# Patient Record
Sex: FEMALE | Race: WHITE | NOT HISPANIC OR LATINO | Employment: OTHER | URBAN - NONMETROPOLITAN AREA
[De-identification: names, ages, dates, MRNs, and addresses within clinical notes are randomized per-mention and may not be internally consistent; named-entity substitution may affect disease eponyms.]

---

## 2018-04-14 ENCOUNTER — OFFICE VISIT (OUTPATIENT)
Dept: FAMILY MEDICINE | Facility: OTHER | Age: 68
End: 2018-04-14
Attending: FAMILY MEDICINE
Payer: COMMERCIAL

## 2018-04-14 VITALS
HEART RATE: 86 BPM | BODY MASS INDEX: 30.59 KG/M2 | SYSTOLIC BLOOD PRESSURE: 130 MMHG | DIASTOLIC BLOOD PRESSURE: 78 MMHG | HEIGHT: 67 IN | WEIGHT: 194.9 LBS | TEMPERATURE: 97.4 F

## 2018-04-14 DIAGNOSIS — N30.01 ACUTE CYSTITIS WITH HEMATURIA: Primary | ICD-10-CM

## 2018-04-14 DIAGNOSIS — R39.89 URINARY PROBLEM: ICD-10-CM

## 2018-04-14 LAB
ALBUMIN UR-MCNC: NEGATIVE MG/DL
APPEARANCE UR: ABNORMAL
BACTERIA #/AREA URNS HPF: ABNORMAL /HPF
BILIRUB UR QL STRIP: NEGATIVE
COLOR UR AUTO: YELLOW
GLUCOSE UR STRIP-MCNC: NEGATIVE MG/DL
HGB UR QL STRIP: ABNORMAL
KETONES UR STRIP-MCNC: NEGATIVE MG/DL
LEUKOCYTE ESTERASE UR QL STRIP: ABNORMAL
NITRATE UR QL: NEGATIVE
PH UR STRIP: 6 PH (ref 5–7)
RBC #/AREA URNS AUTO: ABNORMAL /HPF
SOURCE: ABNORMAL
SP GR UR STRIP: <1.005 (ref 1–1.03)
UROBILINOGEN UR STRIP-ACNC: 0.2 EU/DL (ref 0.2–1)
WBC #/AREA URNS AUTO: >100 /HPF

## 2018-04-14 PROCEDURE — 81001 URINALYSIS AUTO W/SCOPE: CPT | Performed by: FAMILY MEDICINE

## 2018-04-14 PROCEDURE — 87086 URINE CULTURE/COLONY COUNT: CPT | Performed by: FAMILY MEDICINE

## 2018-04-14 PROCEDURE — 87088 URINE BACTERIA CULTURE: CPT | Performed by: FAMILY MEDICINE

## 2018-04-14 PROCEDURE — 99213 OFFICE O/P EST LOW 20 MIN: CPT | Performed by: FAMILY MEDICINE

## 2018-04-14 RX ORDER — SULFAMETHOXAZOLE/TRIMETHOPRIM 800-160 MG
1 TABLET ORAL 2 TIMES DAILY
Qty: 14 TABLET | Refills: 0 | Status: SHIPPED | OUTPATIENT
Start: 2018-04-14 | End: 2019-03-18

## 2018-04-14 ASSESSMENT — PAIN SCALES - GENERAL: PAINLEVEL: MODERATE PAIN (5)

## 2018-04-14 NOTE — NURSING NOTE
Patient presents with dysuria, hot and cold flashes off and on for 2-4 days. Ibuprofen gives relief. Gina Chow LPN .............4/14/2018  11:51 AM

## 2018-04-14 NOTE — MR AVS SNAPSHOT
"              After Visit Summary   4/14/2018    Fay Macedo    MRN: [id number]           Patient Information     Date Of Birth          1950        Visit Information        Provider Department      4/14/2018 11:30 AM Joanne Khan DO [facility]        Today's Diagnoses     Acute cystitis with hematuria    -  1    Urinary problem           Follow-ups after your visit        Who to contact     If you have questions or need follow up information about today's clinic visit or your schedule please contact [facility] directly at [phone].  Normal or non-critical lab and imaging results will be communicated to you by MyChart, letter or phone within 4 business days after the clinic has received the results. If you do not hear from us within 7 days, please contact the clinic through MyChart or phone. If you have a critical or abnormal lab result, we will notify you by phone as soon as possible.  Submit refill requests through [employer] or call your pharmacy and they will forward the refill request to us. Please allow 3 business days for your refill to be completed.          Additional Information About Your Visit        Care EveryWhere ID     This is your Care EveryWhere ID. This could be used by other organizations to access your [facility] medical records  [id number]        Your Vitals Were     Pulse Temperature Height Breastfeeding? BMI (Body Mass Index)       86 97.4  F (36.3  C) (Tympanic) 5' 6.53\" (1.69 m) No 30.95 kg/m2        Blood Pressure from Last 3 Encounters:   04/14/18 130/78    Weight from Last 3 Encounters:   04/14/18 194 lb 14.4 oz (88.4 kg)              We Performed the Following     *UA reflex to Microscopic     Urine Culture Aerobic Bacterial     Urine Microscopic          Today's Medication Changes          These changes are accurate as of 4/14/18 11:59 PM.  If you have any questions, ask your nurse or doctor.               Start taking these " medicines.        Dose/Directions    sulfamethoxazole-trimethoprim 800-160 MG per tablet   Commonly known as:  BACTRIM DS/SEPTRA DS   Used for:  Acute cystitis with hematuria   Started by:  Joanne Khan DO        Dose:  1 tablet   Take 1 tablet by mouth 2 times daily   Quantity:  14 tablet   Refills:  0            Where to get your medicines      These medications were sent to [id number] - [place], [place] - [address]  [address], [facility] [address]     Phone:  [phone]     sulfamethoxazole-trimethoprim 800-160 MG per tablet                Primary Care Provider Fax #    Physician No Ref-Primary [phone]       No address on file        Equal Access to Services     CHARLEE ROSS : Mushtaq Hoang, meena bauman, qanajma kaalmaaaron bradford, chris morocho. So [facility] [phone].    ATENCIÓN: Si habla español, tiene a blanco disposición servicios gratuitos de asistencia lingüística. Llame al [phone].    We comply with applicable federal civil rights laws and [place] laws. We do not discriminate on the basis of race, color, national origin, age, disability, sex, sexual orientation, or gender identity.            Thank you!     Thank you for choosing [facility]  for your care. Our goal is always to provide you with excellent care. Hearing back from our patients is one way we can continue to improve our services. Please take a few minutes to complete the written survey that you may receive in the mail after your visit with us. Thank you!             Your Updated Medication List - Protect others around you: Learn how to safely use, store and throw away your medicines at www.disposemymeds.org.          This list is accurate as of 4/14/18 11:59 PM.  Always use your most recent med list.                   Brand Name Dispense Instructions for use Diagnosis    sulfamethoxazole-trimethoprim 800-160  MG per tablet    BACTRIM DS/SEPTRA DS    14 tablet    Take 1 tablet by mouth 2 times daily    Acute cystitis with hematuria

## 2018-04-15 NOTE — PROGRESS NOTES
"  SUBJECTIVE:   Fay Macedo is a 67 year old female who presents to clinic today for the following health issues:    HPI  URINARY TRACT SYMPTOMS      Duration: ~1 week for some discomfort with urination; however more fevers/chills the last 2-3 days.     Description  dysuria, frequency and hesitancy    Intensity:  mild    Accompanying signs and symptoms:  Fever/chills: YES- recent change; reason she decided to come in.  Flank pain no   Nausea and vomiting: no   Vaginal symptoms: none  Abdominal/Pelvic Pain: no     History  History of frequent UTI's: YES  History of kidney stones: no   Sexually Active: YES  Possibility of pregnancy: No    Precipitating or alleviating factors: None    Therapies tried and outcome: cranberry juice, increase fluid intake and ibuprofen   Outcome: Some relief.    Problem list and histories reviewed & adjusted, as indicated.  Additional history: as documented    There is no problem list on file for this patient.    History reviewed. No pertinent surgical history.    Social History   Substance Use Topics     Smoking status: Never Smoker     Smokeless tobacco: Never Used     Alcohol use No     History reviewed. No pertinent family history.      Current Outpatient Prescriptions   Medication Sig Dispense Refill     sulfamethoxazole-trimethoprim (BACTRIM DS/SEPTRA DS) 800-160 MG per tablet Take 1 tablet by mouth 2 times daily 14 tablet 0     No Known Allergies    ROS:  Constitutional, HEENT, cardiovascular, pulmonary, gi systems are negative, except as otherwise noted.    OBJECTIVE:     /78 (BP Location: Left arm, Patient Position: Sitting, Cuff Size: Adult Regular)  Pulse 86  Temp 97.4  F (36.3  C) (Tympanic)  Ht 5' 6.53\" (1.69 m)  Wt 194 lb 14.4 oz (88.4 kg)  Breastfeeding? No  BMI 30.95 kg/m2  Body mass index is 30.95 kg/(m^2).  GENERAL: healthy, alert and no distress  NECK: no adenopathy, no asymmetry, masses, or scars and thyroid normal to palpation  RESP: lungs clear to " auscultation - no rales, rhonchi or wheezes  CV: regular rate and rhythm, normal S1 S2, no S3 or S4, no murmur, click or rub, no peripheral edema and peripheral pulses strong  ABDOMEN: soft, nontender, no hepatosplenomegaly, no masses and bowel sounds normal  MS: no gross musculoskeletal defects noted, no edema  SKIN: no suspicious lesions or rashes    Diagnostic Test Results:  Results for orders placed or performed in visit on 04/14/18 (from the past 24 hour(s))   *UA reflex to Microscopic   Result Value Ref Range    Color Urine Yellow     Appearance Urine Cloudy     Glucose Urine Negative NEG^Negative mg/dL    Bilirubin Urine Negative NEG^Negative    Ketones Urine Negative NEG^Negative mg/dL    Specific Gravity Urine <1.005 1.003 - 1.035    Blood Urine Moderate (A) NEG^Negative    pH Urine 6.0 5.0 - 7.0 pH    Protein Albumin Urine Negative NEG^Negative mg/dL    Urobilinogen Urine 0.2 0.2 - 1.0 EU/dL    Nitrite Urine Negative NEG^Negative    Leukocyte Esterase Urine Large (A) NEG^Negative    Source Midstream Urine    Urine Microscopic   Result Value Ref Range    WBC Urine >100 (A) OTO5^0 - 5 /HPF    RBC Urine 5-10 (A) OTO2^O - 2 /HPF    Bacteria Urine Few (A) NEG^Negative /HPF   Urine Culture Aerobic Bacterial   Result Value Ref Range    Specimen Description Unspecified Urine     Culture Micro (A)      10,000 to 50,000 colonies/mL  Lactose fermenting gram negative rods         ASSESSMENT/PLAN:     1. Urinary problem  - *UA reflex to Microscopic  - Urine Microscopic    2. Acute cystitis with hematuria  UA with evidence of acute infection.  UC initiated.  Start treatment with Bactrim DS x 7 days.  Continue increased fluid intake.  Discussed pyridium for relief of discomfort if ibuprofen fails to help.  Expected course of resolve reviewed.  Return with any further concerns.  - Urine Culture Aerobic Bacterial  - sulfamethoxazole-trimethoprim (BACTRIM DS/SEPTRA DS) 800-160 MG per tablet; Take 1 tablet by mouth 2 times  daily  Dispense: 14 tablet; Refill: 0    DO RAHUL Rodríguez [facility]

## 2018-04-16 LAB
BACTERIA SPEC CULT: ABNORMAL
SPECIMEN SOURCE: ABNORMAL

## 2019-03-18 ENCOUNTER — OFFICE VISIT (OUTPATIENT)
Dept: FAMILY MEDICINE | Facility: OTHER | Age: 69
End: 2019-03-18
Attending: NURSE PRACTITIONER
Payer: COMMERCIAL

## 2019-03-18 VITALS
OXYGEN SATURATION: 95 % | SYSTOLIC BLOOD PRESSURE: 148 MMHG | HEART RATE: 96 BPM | DIASTOLIC BLOOD PRESSURE: 80 MMHG | WEIGHT: 190 LBS | RESPIRATION RATE: 20 BRPM | TEMPERATURE: 98.9 F | HEIGHT: 67 IN | BODY MASS INDEX: 29.82 KG/M2

## 2019-03-18 DIAGNOSIS — J11.1 INFLUENZA-LIKE ILLNESS: Primary | ICD-10-CM

## 2019-03-18 PROCEDURE — 99214 OFFICE O/P EST MOD 30 MIN: CPT | Performed by: NURSE PRACTITIONER

## 2019-03-18 RX ORDER — ESTROGENS, CONJUGATED 0.45 MG/1
0.45 TABLET, FILM COATED ORAL DAILY
COMMUNITY
Start: 2019-03-16

## 2019-03-18 RX ORDER — BROMFENAC SODIUM 0.7 MG/ML
1 SOLUTION/ DROPS OPHTHALMIC DAILY
COMMUNITY

## 2019-03-18 RX ORDER — BENZONATATE 200 MG/1
200 CAPSULE ORAL 3 TIMES DAILY PRN
Qty: 30 CAPSULE | Refills: 0 | Status: SHIPPED | OUTPATIENT
Start: 2019-03-18 | End: 2019-03-28

## 2019-03-18 RX ORDER — CALCIUM CARBONATE/VITAMIN D3 600 MG-10
250 TABLET ORAL DAILY
COMMUNITY

## 2019-03-18 RX ORDER — CODEINE PHOSPHATE AND GUAIFENESIN 10; 100 MG/5ML; MG/5ML
2 SOLUTION ORAL EVERY 6 HOURS PRN
Qty: 160 ML | Refills: 0 | Status: SHIPPED | OUTPATIENT
Start: 2019-03-18

## 2019-03-18 RX ORDER — OMEGA-3 FATTY ACIDS/FISH OIL 300-1000MG
1 CAPSULE ORAL 2 TIMES DAILY
COMMUNITY

## 2019-03-18 ASSESSMENT — MIFFLIN-ST. JEOR: SCORE: 1434.46

## 2019-03-18 NOTE — NURSING NOTE
Patient presents to the clinic for productive cough, chest congestion x 6 days. Patient has taken DM cough and cold medicine and tylenol for treatment.  Medication Reconciliation: complete    Tianna Armando, CMA

## 2019-03-18 NOTE — PATIENT INSTRUCTIONS
Patient Education     The Flu (Influenza)     The virus that causes the flu spreads through the air in droplets when someone who has the flu coughs, sneezes, laughs, or talks.   The flu (influenza) is an infection that affects your respiratory tract. This tract is made up of your mouth, nose, and lungs, and the passages between them. Unlike a cold, the flu can make you very ill. And it can lead to pneumonia, a serious lung infection. The flu can have serious complications and even cause death.  Who is at risk for the flu?  Anyone can get the flu. But you are more likely to become infected if you:    Have a weakened immune system    Work in a healthcare setting where you may be exposed to flu germs    Live or work with someone who has the flu    Haven t had an annual flu shot  How does the flu spread?  The flu is caused by a virus. The virus spreads through the air in droplets when someone who has the flu coughs, sneezes, laughs, or talks. You can become infected when you inhale these viruses directly. You can also become infected when you touch a surface on which the droplets have landed and then transfer the germs to your eyes, nose, or mouth. Touching used tissues, or sharing utensils, drinking glasses, or a toothbrush from an infected person can expose you to flu viruses, too.  What are the symptoms of the flu?  Flu symptoms tend to come on quickly and may last a few days to a few weeks. They include:    Fever usually higher than 100.4 F  (38 C) and chills    Sore throat and headache    Dry cough    Runny nose    Tiredness and weakness    Muscle aches  Who is at risk for flu complications?  For some people, the flu can be very serious. The risk for complications is greater for:    Children younger than age 5    Adults ages 65 and older    People with a chronic illness such as diabetes or heart, kidney, or lung disease    People who live in a nursing home or long-term care facility   How is the flu treated?  The  flu usually gets better after 7 days or so. In some cases, your healthcare provider may prescribe an antiviral medicine. This may help you get well a little sooner. For the medicine to help, you need to take it as soon as possible (ideally within 48 hours) after your symptoms start. If you develop pneumonia or other serious illness, you may need to stay in the hospital.  Easing flu symptoms    Drink lots of fluids such as water, juice, and warm soup. A good rule is to drink enough so that you urinate your normal amount.    Get plenty of rest.    Ask your healthcare provider what to take for fever and pain.    Call your provider if your fever is 100.4 F (38 C) or higher, or you become dizzy, lightheaded, or short of breath.  Taking steps to protect others    Wash your hands often, especially after coughing or sneezing. Or clean your hands with an alcohol-based hand  containing at least 60% alcohol.    Cough or sneeze into a tissue. Then throw the tissue away and wash your hands. If you don t have a tissue, cough and sneeze into your elbow.    Stay home until at least 24 hours after you no longer have a fever or chills. Be sure the fever isn t being hidden by fever-reducing medicine.    Don t share food, utensils, drinking glasses, or a toothbrush with others.    Ask your healthcare provider if others in your household should get antiviral medicine to help them avoid infection.  How can the flu be prevented?    One of the best ways to avoid the flu is to get a flu vaccine each year. The virus that causes the flu changes from year to year. For that reason, healthcare providers recommend getting the flu vaccine each year, as soon as it's available in your area. The vaccine is given as a shot. Your healthcare provider can tell you which vaccine is right for you. The nasal spray is not recommended for the 5140-4203 flu season. The CDC says the nasal spray did not seem to protect against the flu over the last  several flu seasons.    Wash your hands often. Frequent handwashing is a proven way to help prevent infection.    Carry an alcohol-based hand gel containing at least 60% alcohol. Use it when you can't use soap and water. Then wash your hands as soon as you can.    Avoid touching your eyes, nose, and mouth.    At home and work, clean phones, computer keyboards, and toys often with disinfectant wipes.    If possible, avoid close contact with others who have the flu or symptoms of the flu.  Handwashing tips  Handwashing is one of the best ways to prevent many common infections. If you are caring for or visiting someone with the flu, wash your hands each time you enter and leave the room. Follow these steps:    Use warm water and plenty of soap. Rub your hands together well.    Clean the whole hand, including under your nails, between your fingers, and up the wrists.    Wash for at least 15 seconds.    Rinse, letting the water run down your fingers, not up your wrists.    Dry your hands well. Use a paper towel to turn off the faucet and open the door.  Using alcohol-based hand   Alcohol-based hand  are also a good choice. Use them when you can't use soap and water. Follow these steps:    Squeeze about a tablespoon of gel into the palm of one hand.    Rub your hands together briskly, cleaning the backs of your hands, the palms, between your fingers, and up the wrists.    Rub until the gel is gone and your hands are completely dry.  Preventing the flu in healthcare settings  The flu is a special concern for people in hospitals and long-term care facilities. To help prevent the spread of flu, many hospitals and nursing homes take these steps:    Healthcare providers wash their hands or use an alcohol-based hand  before and after treating each patient.    People with the flu have private rooms and bathrooms or share a room with someone with the same infection.    People who are at high risk for the  flu but don't have it are encouraged to get the flu and pneumonia vaccines.    All healthcare workers are encouraged or required to get flu shots.

## 2019-03-18 NOTE — PROGRESS NOTES
"Nursing Notes:   Tianna Armando CMA  3/18/2019 10:24 AM  Signed  Patient presents to the clinic for productive cough, chest congestion x 6 days. Patient has taken DM cough and cold medicine and tylenol for treatment.  Medication Reconciliation: complete    Tianna Armando CMA      SUBJECTIVE:   Fay Macedo is a 68 year old female who presents to clinic today for the following health issues:    RESPIRATORY SYMPTOMS      Duration: 5-6 days    Description  nasal congestion, sore throat, cough, chills, fatigue/malaise and myalgias    Severity: severe    Accompanying signs and symptoms: Unknown fever, suspected, chills.     History (predisposing factors):  none    Precipitating or alleviating factors: None    Therapies tried and outcome:  rest and fluids acetaminophen OTC NSAID guaifenesin    She did not get a flu shot this season.     Problem list and histories reviewed & adjusted, as indicated.  Additional history: as documented    There is no problem list on file for this patient.    History reviewed. No pertinent surgical history.    Social History     Tobacco Use     Smoking status: Never Smoker     Smokeless tobacco: Never Used   Substance Use Topics     Alcohol use: No     History reviewed. No pertinent family history.      Current Outpatient Medications   Medication Sig Dispense Refill     aspirin (ASA) 81 MG tablet        bromfenac (PROLENSA) 0.07 % ophthalmic solution        CALCIUM PO        omega 3 1000 MG CAPS        PREMARIN 0.45 MG tablet        Psyllium 48.57 % POWD        vitamin B-12 (CYANOCOBALAMIN) 250 MCG tablet        No Known Allergies      ROS:  Notable findings in the HPI.       OBJECTIVE:     /80 (BP Location: Left arm, Patient Position: Sitting, Cuff Size: Adult Regular)   Pulse 96   Temp 98.9  F (37.2  C) (Tympanic)   Resp 20   Ht 1.702 m (5' 7\")   Wt 86.2 kg (190 lb)   SpO2 95%   BMI 29.76 kg/m    Body mass index is 29.76 kg/m .  GENERAL: alert and no distress  EYES: Eyes " grossly normal to inspection  HENT: normal cephalic/atraumatic, right ear: normal: no effusions, no erythema, normal landmarks, left ear: normal: no effusions, no erythema, normal landmarks, nose and mouth without ulcers or lesions, nasal mucosa edematous , rhinorrhea yellow, oropharynx clear and oral mucous membranes moist  NECK: no adenopathy  RESP: lungs clear to auscultation - no rales, rhonchi or wheezes, dry cough noted  CV: regular rate and rhythm, normal S1 S2, no S3 or S4, no murmur, click or rub, no peripheral edema and peripheral pulses strong  SKIN: no suspicious lesions or rashes  PSYCH: mentation appears normal, affect normal/bright    Diagnostic Test Results:  none     ASSESSMENT/PLAN:     1. Influenza-like illness  - guaiFENesin-codeine (ROBITUSSIN AC) 100-10 MG/5ML solution; Take 10 mLs by mouth every 6 hours as needed for cough  Dispense: 160 mL; Refill: 0  - benzonatate (TESSALON) 200 MG capsule; Take 1 capsule (200 mg) by mouth 3 times daily as needed for cough  Dispense: 30 capsule; Refill: 0      PLAN:    URI Adult:  Tylenol, Ibuprofen, Fluids, Rest, OTC cough suppressant/expectorant, OTC decongestant/antihistamine, Saline gargles, Saline nasal spray, Vaporizer and   Testing not treatment is indicated at this time.     Followup:    If not improving or if condition worsens, follow up with your Primary Care Provider    I explained my diagnostic considerations and recommendations to the patient, who voiced understanding and agreement with the treatment plan. All questions were answered. We discussed potential side effects of any prescribed or recommended therapies, as well as expectations for response to treatments. She was advised to contact our office if there is no improvement or worsening of conditions or symptoms.  If s/s worsen or persist, patient will either come back or follow up with PCP.    Disclaimer:  This note consists of words and symbols derived from keyboarding, dictation, or using  voice recognition software. As a result, there may be errors in the script that have gone undetected. Please consider this when interpreting information found in this note.      Lorna Contreras NP, 3/18/2019 10:23 AM

## 2019-03-20 ENCOUNTER — HOSPITAL ENCOUNTER (INPATIENT)
Facility: OTHER | Age: 69
LOS: 1 days | Discharge: HOME | DRG: 195 | End: 2019-03-22
Attending: INTERNAL MEDICINE | Admitting: INTERNAL MEDICINE
Payer: COMMERCIAL

## 2019-03-20 ENCOUNTER — OFFICE VISIT (OUTPATIENT)
Dept: FAMILY MEDICINE | Facility: OTHER | Age: 69
DRG: 195 | End: 2019-03-20
Attending: NURSE PRACTITIONER
Payer: COMMERCIAL

## 2019-03-20 ENCOUNTER — HOSPITAL ENCOUNTER (OUTPATIENT)
Dept: GENERAL RADIOLOGY | Facility: OTHER | Age: 69
DRG: 195 | End: 2019-03-20
Attending: NURSE PRACTITIONER
Payer: COMMERCIAL

## 2019-03-20 VITALS
WEIGHT: 190 LBS | BODY MASS INDEX: 29.82 KG/M2 | SYSTOLIC BLOOD PRESSURE: 130 MMHG | TEMPERATURE: 98.1 F | RESPIRATION RATE: 18 BRPM | OXYGEN SATURATION: 96 % | HEART RATE: 92 BPM | DIASTOLIC BLOOD PRESSURE: 68 MMHG | HEIGHT: 67 IN

## 2019-03-20 DIAGNOSIS — J18.9 PNEUMONIA OF RIGHT LOWER LOBE DUE TO INFECTIOUS ORGANISM: ICD-10-CM

## 2019-03-20 DIAGNOSIS — E86.0 DEHYDRATION: ICD-10-CM

## 2019-03-20 DIAGNOSIS — J18.9 COMMUNITY ACQUIRED PNEUMONIA OF LEFT LOWER LOBE OF LUNG: ICD-10-CM

## 2019-03-20 DIAGNOSIS — R11.14 BILIOUS VOMITING WITH NAUSEA: ICD-10-CM

## 2019-03-20 DIAGNOSIS — R05.9 COUGH: ICD-10-CM

## 2019-03-20 DIAGNOSIS — J10.1 INFLUENZA A: ICD-10-CM

## 2019-03-20 DIAGNOSIS — R11.2 INTRACTABLE VOMITING WITH NAUSEA, UNSPECIFIED VOMITING TYPE: ICD-10-CM

## 2019-03-20 DIAGNOSIS — J10.1 INFLUENZA A: Primary | ICD-10-CM

## 2019-03-20 DIAGNOSIS — R05.9 COUGH: Primary | ICD-10-CM

## 2019-03-20 LAB
ANION GAP SERPL CALCULATED.3IONS-SCNC: 9 MMOL/L (ref 3–14)
BASOPHILS # BLD AUTO: 0 10E9/L (ref 0–0.2)
BASOPHILS NFR BLD AUTO: 0.4 %
BUN SERPL-MCNC: 9 MG/DL (ref 7–25)
CALCIUM SERPL-MCNC: 9.4 MG/DL (ref 8.6–10.3)
CHLORIDE SERPL-SCNC: 99 MMOL/L (ref 98–107)
CO2 SERPL-SCNC: 26 MMOL/L (ref 21–31)
CREAT SERPL-MCNC: 0.68 MG/DL (ref 0.6–1.2)
CRP SERPL-MCNC: 12 MG/L
DIFFERENTIAL METHOD BLD: NORMAL
EOSINOPHIL # BLD AUTO: 0 10E9/L (ref 0–0.7)
EOSINOPHIL NFR BLD AUTO: 0.4 %
ERYTHROCYTE [DISTWIDTH] IN BLOOD BY AUTOMATED COUNT: 13 % (ref 10–15)
FLUAV+FLUBV RNA SPEC QL NAA+PROBE: NEGATIVE
FLUAV+FLUBV RNA SPEC QL NAA+PROBE: POSITIVE
GFR SERPL CREATININE-BSD FRML MDRD: 87 ML/MIN/{1.73_M2}
GLUCOSE SERPL-MCNC: 136 MG/DL (ref 70–105)
HCT VFR BLD AUTO: 43.3 % (ref 35–47)
HGB BLD-MCNC: 14.2 G/DL (ref 11.7–15.7)
IMM GRANULOCYTES # BLD: 0 10E9/L (ref 0–0.4)
IMM GRANULOCYTES NFR BLD: 0.2 %
LYMPHOCYTES # BLD AUTO: 1.3 10E9/L (ref 0.8–5.3)
LYMPHOCYTES NFR BLD AUTO: 15 %
MCH RBC QN AUTO: 29.6 PG (ref 26.5–33)
MCHC RBC AUTO-ENTMCNC: 32.8 G/DL (ref 31.5–36.5)
MCV RBC AUTO: 90 FL (ref 78–100)
MONOCYTES # BLD AUTO: 0.6 10E9/L (ref 0–1.3)
MONOCYTES NFR BLD AUTO: 6.9 %
NEUTROPHILS # BLD AUTO: 6.6 10E9/L (ref 1.6–8.3)
NEUTROPHILS NFR BLD AUTO: 77.1 %
PLATELET # BLD AUTO: 212 10E9/L (ref 150–450)
POTASSIUM SERPL-SCNC: 3.8 MMOL/L (ref 3.5–5.1)
PROCALCITONIN SERPL-MCNC: 0.4 NG/ML
RBC # BLD AUTO: 4.79 10E12/L (ref 3.8–5.2)
RSV RNA SPEC NAA+PROBE: NEGATIVE
SODIUM SERPL-SCNC: 134 MMOL/L (ref 134–144)
SPECIMEN SOURCE: ABNORMAL
WBC # BLD AUTO: 8.5 10E9/L (ref 4–11)

## 2019-03-20 PROCEDURE — 99219 ZZC INITIAL OBSERVATION CARE,LEVL II: CPT | Performed by: INTERNAL MEDICINE

## 2019-03-20 PROCEDURE — 85025 COMPLETE CBC W/AUTO DIFF WBC: CPT | Performed by: NURSE PRACTITIONER

## 2019-03-20 PROCEDURE — 25000132 ZZH RX MED GY IP 250 OP 250 PS 637: Performed by: INTERNAL MEDICINE

## 2019-03-20 PROCEDURE — 25000128 H RX IP 250 OP 636: Performed by: INTERNAL MEDICINE

## 2019-03-20 PROCEDURE — 84145 PROCALCITONIN (PCT): CPT | Performed by: INTERNAL MEDICINE

## 2019-03-20 PROCEDURE — 99214 OFFICE O/P EST MOD 30 MIN: CPT | Performed by: NURSE PRACTITIONER

## 2019-03-20 PROCEDURE — 71046 X-RAY EXAM CHEST 2 VIEWS: CPT

## 2019-03-20 PROCEDURE — 25000125 ZZHC RX 250: Performed by: INTERNAL MEDICINE

## 2019-03-20 PROCEDURE — 25800030 ZZH RX IP 258 OP 636: Performed by: INTERNAL MEDICINE

## 2019-03-20 PROCEDURE — 40000275 ZZH STATISTIC RCP TIME EA 10 MIN

## 2019-03-20 PROCEDURE — 87631 RESP VIRUS 3-5 TARGETS: CPT | Performed by: NURSE PRACTITIONER

## 2019-03-20 PROCEDURE — 86140 C-REACTIVE PROTEIN: CPT | Performed by: NURSE PRACTITIONER

## 2019-03-20 PROCEDURE — 36415 COLL VENOUS BLD VENIPUNCTURE: CPT | Performed by: NURSE PRACTITIONER

## 2019-03-20 PROCEDURE — 80048 BASIC METABOLIC PNL TOTAL CA: CPT | Performed by: NURSE PRACTITIONER

## 2019-03-20 PROCEDURE — 94640 AIRWAY INHALATION TREATMENT: CPT

## 2019-03-20 PROCEDURE — G0378 HOSPITAL OBSERVATION PER HR: HCPCS

## 2019-03-20 RX ORDER — SODIUM CHLORIDE 9 MG/ML
INJECTION, SOLUTION INTRAVENOUS CONTINUOUS
Status: DISCONTINUED | OUTPATIENT
Start: 2019-03-20 | End: 2019-03-22 | Stop reason: HOSPADM

## 2019-03-20 RX ORDER — LIDOCAINE 40 MG/G
CREAM TOPICAL
Status: DISCONTINUED | OUTPATIENT
Start: 2019-03-20 | End: 2019-03-22 | Stop reason: HOSPADM

## 2019-03-20 RX ORDER — PROCHLORPERAZINE 25 MG
12.5 SUPPOSITORY, RECTAL RECTAL EVERY 12 HOURS PRN
Status: DISCONTINUED | OUTPATIENT
Start: 2019-03-20 | End: 2019-03-22 | Stop reason: HOSPADM

## 2019-03-20 RX ORDER — ALBUTEROL SULFATE 0.83 MG/ML
2.5 SOLUTION RESPIRATORY (INHALATION)
Status: DISCONTINUED | OUTPATIENT
Start: 2019-03-20 | End: 2019-03-22 | Stop reason: HOSPADM

## 2019-03-20 RX ORDER — OSELTAMIVIR PHOSPHATE 75 MG/1
75 CAPSULE ORAL 2 TIMES DAILY
Status: DISCONTINUED | OUTPATIENT
Start: 2019-03-20 | End: 2019-03-22 | Stop reason: HOSPADM

## 2019-03-20 RX ORDER — ONDANSETRON 2 MG/ML
4 INJECTION INTRAMUSCULAR; INTRAVENOUS
Status: DISCONTINUED | OUTPATIENT
Start: 2019-03-20 | End: 2019-03-22 | Stop reason: HOSPADM

## 2019-03-20 RX ORDER — MULTIVITAMIN,THERAPEUTIC
1 TABLET ORAL DAILY
COMMUNITY

## 2019-03-20 RX ORDER — IBUPROFEN 600 MG/1
600 TABLET, FILM COATED ORAL EVERY 6 HOURS PRN
Status: DISCONTINUED | OUTPATIENT
Start: 2019-03-20 | End: 2019-03-22 | Stop reason: HOSPADM

## 2019-03-20 RX ORDER — PROCHLORPERAZINE MALEATE 5 MG
5 TABLET ORAL EVERY 6 HOURS PRN
Status: DISCONTINUED | OUTPATIENT
Start: 2019-03-20 | End: 2019-03-22 | Stop reason: HOSPADM

## 2019-03-20 RX ORDER — TRIAMCINOLONE ACETONIDE 1 MG/G
OINTMENT TOPICAL 2 TIMES DAILY PRN
COMMUNITY

## 2019-03-20 RX ORDER — NALOXONE HYDROCHLORIDE 0.4 MG/ML
.1-.4 INJECTION, SOLUTION INTRAMUSCULAR; INTRAVENOUS; SUBCUTANEOUS
Status: DISCONTINUED | OUTPATIENT
Start: 2019-03-20 | End: 2019-03-22 | Stop reason: HOSPADM

## 2019-03-20 RX ORDER — ASPIRIN 81 MG/1
81 TABLET, CHEWABLE ORAL DAILY
Status: DISCONTINUED | OUTPATIENT
Start: 2019-03-20 | End: 2019-03-22 | Stop reason: HOSPADM

## 2019-03-20 RX ORDER — ACETAMINOPHEN 325 MG/1
650 TABLET ORAL
Status: DISCONTINUED | OUTPATIENT
Start: 2019-03-20 | End: 2019-03-22 | Stop reason: HOSPADM

## 2019-03-20 RX ADMIN — OSELTAMIVIR 75 MG: 75 CAPSULE ORAL at 22:43

## 2019-03-20 RX ADMIN — ONDANSETRON 4 MG: 2 INJECTION INTRAMUSCULAR; INTRAVENOUS at 14:39

## 2019-03-20 RX ADMIN — SODIUM CHLORIDE: 9 INJECTION, SOLUTION INTRAVENOUS at 22:44

## 2019-03-20 RX ADMIN — SALINE NASAL SPRAY 1 SPRAY: 1.5 SOLUTION NASAL at 18:36

## 2019-03-20 RX ADMIN — SODIUM CHLORIDE 1000 ML: 9 INJECTION, SOLUTION INTRAVENOUS at 14:31

## 2019-03-20 RX ADMIN — SODIUM CHLORIDE: 9 INJECTION, SOLUTION INTRAVENOUS at 15:43

## 2019-03-20 RX ADMIN — ACETAMINOPHEN 650 MG: 325 TABLET, FILM COATED ORAL at 22:43

## 2019-03-20 RX ADMIN — ALBUTEROL SULFATE 2.5 MG: 2.5 SOLUTION RESPIRATORY (INHALATION) at 22:56

## 2019-03-20 RX ADMIN — ONDANSETRON 4 MG: 2 INJECTION INTRAMUSCULAR; INTRAVENOUS at 21:41

## 2019-03-20 RX ADMIN — ALBUTEROL SULFATE 2.5 MG: 2.5 SOLUTION RESPIRATORY (INHALATION) at 16:40

## 2019-03-20 RX ADMIN — PROCHLORPERAZINE EDISYLATE 5 MG: 5 INJECTION INTRAMUSCULAR; INTRAVENOUS at 17:09

## 2019-03-20 RX ADMIN — ACETAMINOPHEN 650 MG: 325 TABLET, FILM COATED ORAL at 14:38

## 2019-03-20 RX ADMIN — OSELTAMIVIR 75 MG: 75 CAPSULE ORAL at 14:38

## 2019-03-20 ASSESSMENT — ENCOUNTER SYMPTOMS
RHINORRHEA: 1
SHORTNESS OF BREATH: 1
SORE THROAT: 1
CHEST TIGHTNESS: 1
FEVER: 0
ABDOMINAL PAIN: 1
DIARRHEA: 0
ACTIVITY CHANGE: 1
SINUS PAIN: 0
SINUS PRESSURE: 0
CHILLS: 1
VOMITING: 1
COUGH: 1
NAUSEA: 1
FATIGUE: 1

## 2019-03-20 ASSESSMENT — PAIN SCALES - GENERAL: PAINLEVEL: WORST PAIN (10)

## 2019-03-20 ASSESSMENT — MIFFLIN-ST. JEOR: SCORE: 1434.46

## 2019-03-20 NOTE — PHARMACY-ADMISSION MEDICATION HISTORY
Pharmacy -- Admission Medication Reconciliation    Prior to admission (PTA) medications were reviewed and the patient's PTA medication list was updated.    Sources Consulted: [facility], [facility] [place], [place], [facility] [place], [place], patient,  (nothing listed yet)  Faxed [facility] pharmacy faxed (no response at time of note)    The reliability of this Medication Reconciliation is: Reliability: Borderline reliable    The following significant changes were made:  Added directions to aspirin  Added directions to bromfenac  Removed calcium, added calcium with d and added directions  Added directions to fish oil  Added directions to premarin  Added directions to psyllium  Added multivitamin  Added ocean nasal spray  Added directions to cyanocobalamin  Added triamcinolone ointment  Added times and estimated dates per patient    In addition, the patient's allergies were reviewed with the patient and updated as follows:   Allergies: Patient has no known allergies.    The pharmacist has reviewed with the patient that all personal medications should be removed from the building or locked in the belongings safe.  Patient shall only take medications ordered by the physician and administered by the nursing staff.       Medication barriers identified: none noted   Medication adherence concerns: patient has not taken most medications for past 2 weeks due to not feeling well   Understanding of emergency medications: n/a    Luz Mckeon [facility], 3/20/2019,  5:01 PM

## 2019-03-20 NOTE — PROGRESS NOTES
Patient was requesting toast from another staff.  This writer went in to check with patient, and patient stated that she was still having nausea but felt like she should eat.  Discussion on pushing fluids and rest was the best plan- and to avoid solid food until nausea subsides.  Patient agreed to more broth, nasal spray, and jello.  Evelin Kirkpatrick RN on 3/20/2019 at 6:41 PM

## 2019-03-20 NOTE — H&P
[facility]    History and Physical  Hospitalist       Date of Admission:  3/20/2019    Assessment & Plan   Fay Macedo is a 68 year old female who presents with influenza A     Principal Problem:    Influenza A    Assessment: PCR positive, productive cough but no fever supplemental oxygen requirement and left lower lobe infiltrate given very low pro-calcitonin and normal white count likely represents a viral infection.  If having fevers, elevating pro-calcitonin or worsening cough will then consider empiric antibiotic treatment.    Plan: - Tamiflu day 1   - scheduled nebulizer's and apap   - droplet precautions   - follow-up Chest x-ray findings as an outpatient to ensure clearing    Active Problems:    Intractable nausea and vomiting    Assessment: Stable likely related to viral infection, no evidence of bowel obstruction or metabolic abnormalities    Plan: - Schedule Zofran     FEN: clears and adat, normal saline at 125mL/hr  PPX: early ambulation  Code Status: Full Code    Ben Stacy    Primary Care Physician   Physician No Ref-Primary    Chief Complaint   Coughing and flulike symptoms    History is obtained from the patient and chart review.    History of Present Illness   Fay Macedo is a 68 year old female who presents with influenza A.  Patient was seen in rapid clinic on 3/18, thought to have a URI and was prescribed some cough suppressants.  These made her sick to her stomach and over the last number of days or flulike symptoms have remained persistent with a productive cough, sore throat and frequent nonbilious nonbloody vomiting.  She is been having difficulty can keep any fluids down presented back to clinic today, there she underwent a chest x-ray with a possible left lower lobe infiltrate, blood work was unremarkable and she was subsequently placed on observation for further management.    Past Medical History    I have reviewed this patient's medical history and  updated it with pertinent information if needed.   History reviewed. No pertinent past medical history.    Past Surgical History   I have reviewed this patient's surgical history and updated it with pertinent information if needed.  History reviewed. No pertinent surgical history.    Prior to Admission Medications   Prior to Admission Medications   Prescriptions Last Dose Informant Patient Reported? Taking?   CALCIUM PO   Yes No   PREMARIN 0.45 MG tablet   Yes No   Psyllium 48.57 % POWD   Yes No   aspirin (ASA) 81 MG tablet   Yes No   benzonatate (TESSALON) 200 MG capsule   No No   Sig: Take 1 capsule (200 mg) by mouth 3 times daily as needed for cough   bromfenac (PROLENSA) 0.07 % ophthalmic solution   Yes No   guaiFENesin-codeine (ROBITUSSIN AC) 100-10 MG/5ML solution   No No   Sig: Take 10 mLs by mouth every 6 hours as needed for cough   omega 3 1000 MG CAPS   Yes No   vitamin B-12 (CYANOCOBALAMIN) 250 MCG tablet   Yes No      Facility-Administered Medications: None     Allergies   No Known Allergies    Social History   I have reviewed this patient's social history and updated it with pertinent information if needed. Fay Macedo  reports that  has never smoked. she has never used smokeless tobacco. She reports that she does not drink alcohol or use drugs.    Family History   I have reviewed this patient's family history and updated it with pertinent information if needed.   History reviewed. No pertinent family history.    Review of Systems     REVIEW OF SYSTEMS:    Constitutional: Poor energy and appetite, developed flulike symptoms over a week ago.    Eyes: no changes in vision  Ears, nose, mouth, throat, and face: no mouth sores, dysphagia, or odynophagia  Respiratory: no shortness of breath, green productive cough, no wheezing.    Cardiovascular: no chest pain, palpitations, orthopnea, increased lower extremity edema, or syncope.   Gastrointestinal: no constipation, diarrhea, or abdominal  pain.  Genitourinary: no dysuria, hematuria, urgency or frequency.   Hematologic/lymphatic: no unintentional weight loss or night sweats.  Musculoskeletal: no pain to extremities or falls.   Endocrine: not a known diabetic.     Physical Exam   Temp: 98.2  F (36.8  C) Temp src: Tympanic BP: 140/82 Pulse: 84   Resp: 16        Vital Signs with Ranges  Temp:  [98.1  F (36.7  C)-98.2  F (36.8  C)] 98.2  F (36.8  C)  Pulse:  [84-92] 84  Resp:  [16-18] 16  BP: (130-140)/(68-82) 140/82  SpO2:  [96 %] 96 %  186 lbs 4.62 oz    Exam:  GENERAL: Talkative, in no apparent distress.  Head: normocephalic and atraumatic  Eyes: anicteric and non-injected sclera  Nose: no rhinorrhea or epistaxis.   Throat: moist mucous membranes with no active oral lesions.  NECK: Supple, jugular venous distension not present.  CARDIOVASCULAR: regular rate and rhythm, no murmurs, rubs, or gallops. Normal S1/S2. No lower extremity edema.   RESPIRATORY: Scant bibasilar rhonchi left greater than right, diffuse air movement in all fields, no wheezes, no crackles, no accessory muscle use or evidence of respiratory distress.    GI: soft, non-tender, non-distended, normoactive bowel sounds.  MUSCULOSKELETAL: warm and well perfused, 2+ dorsalis pedis pulses.    SKIN: no pallor, jaundice or rashes.  NEUROLOGY: AAOx3, follows commands, speech and language without focal deficits.        Data   Data reviewed today:  I personally reviewed the chest x-ray image(s) showing Left lower lobe infiltrate.  Recent Labs   Lab 03/20/19  1116   WBC 8.5   HGB 14.2   MCV 90         POTASSIUM 3.8   CHLORIDE 99   CO2 26   BUN 9   CR 0.68   ANIONGAP 9   EBENEZER 9.4   *       Recent Results (from the past 24 hour(s))   XR Chest 2 Views    Narrative    PROCEDURE:  XR CHEST 2 VW    HISTORY:  Cough.     COMPARISON:  None.    FINDINGS:   The cardiac silhouette is normal in size. The pulmonary vasculature is  normal.  There are mild airspace opacities in the left lung  base. The  lungs are otherwise clear. No pleural effusion or pneumothorax. Old  left clavicular fracture is noted.      Impression    IMPRESSION:  Left basilar infiltrate. Suggest follow-up to ensure  resolution.      JOSR BENSON MD

## 2019-03-20 NOTE — PROGRESS NOTES
Call to pharmacy with request for saline bolus.  They will tube it to the unit.  Evelin Kirkpatrick RN on 3/20/2019 at 2:16 PM

## 2019-03-20 NOTE — PROGRESS NOTES
"  SUBJECTIVE:   Fay Macedo is a 68 year old female who presents to clinic today for the following health issues:    HPI: Patient presents for cough, chills, fatigue and nausea/vomiting. Patient reports symptoms started on 3/12 when she was visiting her daughter. She reports she was seen in rapid clinic on 3/18 with suspected influenza, as grandchildren both had influenza A. She was given cough suppressant/expectorant and told to rest and increase fluids. She reports symptoms did not improve. She is unable to keep any of the OTC medication down. She has had multiple episodes of emesis over the last two-three days. I've lost count how many times I've thrown up. She reports, \"I have not eaten for days.\" She is only tolerating small sips of water. She feels like her abdomen is on fire. She has throat pain and cough has not improved. \"I just feel terrible. If I return to my daughter's house there is no one to take care of me.\"   She is drinking from her water bottle in the clinic, but about 20 minutes later had a small emesis.     There are no active problems to display for this patient.    History reviewed. No pertinent past medical history.   History reviewed. No pertinent surgical history.    Review of Systems   Constitutional: Positive for activity change, chills and fatigue. Negative for fever.   HENT: Positive for rhinorrhea and sore throat. Negative for congestion, ear discharge, ear pain, postnasal drip, sinus pressure and sinus pain.    Respiratory: Positive for cough, chest tightness and shortness of breath.    Gastrointestinal: Positive for abdominal pain, nausea and vomiting. Negative for diarrhea.    She is sitting in a wheelchair in the exam room with head in her lap.         OBJECTIVE:     /68 (BP Location: Left arm, Patient Position: Sitting, Cuff Size: Adult Large)   Pulse 92   Temp 98.1  F (36.7  C) (Tympanic)   Resp 18   Ht 1.702 m (5' 7\")   Wt 86.2 kg (190 lb)   SpO2 96%   " Breastfeeding? No   BMI 29.76 kg/m    Body mass index is 29.76 kg/m .  Physical Exam   Constitutional: She has a sickly appearance.   HENT:   Head: Normocephalic.   Right Ear: Tympanic membrane and external ear normal.   Left Ear: Tympanic membrane and external ear normal.   Mouth/Throat: Mucous membranes are dry. Posterior oropharyngeal erythema present. No oropharyngeal exudate or posterior oropharyngeal edema.   Eyes: Conjunctivae are normal.   Cardiovascular: Regular rhythm and normal heart sounds.   Pulmonary/Chest: Effort normal. She has decreased breath sounds in the right lower field. She has rales in the left middle field and the left lower field.   Abdominal: Soft. There is generalized tenderness.   Neurological: She is alert.   Skin: Skin is warm and dry.     CURB-65: Score 1 (Low Risk)   Diagnostic Test Results:  Results for orders placed or performed in visit on 03/20/19 (from the past 24 hour(s))   Influenza A and B and RSV PCR   Result Value Ref Range    Specimen Description Nasopharyngeal     Influenza A PCR Positive (A) NEG^Negative    Influenza B PCR Negative NEG^Negative    Resp Syncytial Virus Negative NEG^Negative   CRP inflammation   Result Value Ref Range    CRP Inflammation 12.0 (H) <0.5 mg/L   CBC and Differential   Result Value Ref Range    WBC 8.5 4.0 - 11.0 10e9/L    RBC Count 4.79 3.8 - 5.2 10e12/L    Hemoglobin 14.2 11.7 - 15.7 g/dL    Hematocrit 43.3 35.0 - 47.0 %    MCV 90 78 - 100 fl    MCH 29.6 26.5 - 33.0 pg    MCHC 32.8 31.5 - 36.5 g/dL    RDW 13.0 10.0 - 15.0 %    Platelet Count 212 150 - 450 10e9/L    Diff Method Automated Method     % Neutrophils 77.1 %    % Lymphocytes 15.0 %    % Monocytes 6.9 %    % Eosinophils 0.4 %    % Basophils 0.4 %    % Immature Granulocytes 0.2 %    Absolute Neutrophil 6.6 1.6 - 8.3 10e9/L    Absolute Lymphocytes 1.3 0.8 - 5.3 10e9/L    Absolute Monocytes 0.6 0.0 - 1.3 10e9/L    Absolute Eosinophils 0.0 0.0 - 0.7 10e9/L    Absolute Basophils 0.0 0.0  - 0.2 10e9/L    Abs Immature Granulocytes 0.0 0 - 0.4 10e9/L   Basic Metabolic Panel   Result Value Ref Range    Sodium 134 134 - 144 mmol/L    Potassium 3.8 3.5 - 5.1 mmol/L    Chloride 99 98 - 107 mmol/L    Carbon Dioxide 26 21 - 31 mmol/L    Anion Gap 9 3 - 14 mmol/L    Glucose 136 (H) 70 - 105 mg/dL    Urea Nitrogen 9 7 - 25 mg/dL    Creatinine 0.68 0.60 - 1.20 mg/dL    GFR Estimate 87 >60 mL/min/[1.73_m2]    GFR Estimate If Black >90 >60 mL/min/[1.73_m2]    Calcium 9.4 8.6 - 10.3 mg/dL     PROCEDURE:  XR CHEST 2 VW     HISTORY:  Cough.      COMPARISON:  None.     FINDINGS:   The cardiac silhouette is normal in size. The pulmonary vasculature is  normal.  There are mild airspace opacities in the left lung base. The  lungs are otherwise clear. No pleural effusion or pneumothorax. Old  left clavicular fracture is noted.                                                                      IMPRESSION:  Left basilar infiltrate. Suggest follow-up to ensure  resolution.    ASSESSMENT/PLAN:   1. Cough  Influenza Positive. Chest x-ray shows left basilar infiltrate.   - Influenza A and B and RSV PCR  - XR Chest 2 Views; Future    2. Bilious vomiting with nausea  - Basic Metabolic Panel; Future  - CBC and Differential; Future  - CRP inflammation; Future  - CRP inflammation  - CBC and Differential  - Basic Metabolic Panel    3. Pneumonia of right lower lobe due to infectious organism (H)  At this time I do not think patient can be treated as outpatient due to vomiting/nausea and current dehydration. She is not tolerating oral fluids, and will likely need IV fluids to correct dehydration. I discussed the case with Dr. Stacy and he will resume care of patient. Patient moved to med/surg under observation. Updated her daughter via telephone.     4. Dehydration:   Likely need IV fluids as not tolerating oral fluids.     5. Influenza A:   Would likely not benefit from Tamiflu due to length of symptoms. Continue symptomatic cares.      Salma Mejia [facility] AND [facility]

## 2019-03-20 NOTE — PROGRESS NOTES
ADMISSION NOTE    Patient admitted to room 313 at approximately 1300 via wheel chair from clinic. Patient was accompanied by nurse.     Verbal SBAR report received from clinic during patient arrival.     Patient ambulated to bed independently. Patient alert and oriented X 3. The patient is not having any pain. 0-10 Pain Scale: 8. Admission vital signs: Blood pressure 140/82, pulse 84, temperature 98.2  F (36.8  C), temperature source Tympanic, resp. rate 16, weight 84.5 kg (186 lb 4.6 oz), not currently breastfeeding. Patient was oriented to plan of care, call light, bed controls, tv, telephone, bathroom and visiting hours.       Evelin Kirkpatrick

## 2019-03-20 NOTE — NURSING NOTE
Patient presents to clinic for follow up experiencing ongoing productive cough, emesis, chest congestion, body aches and fatigue.    Medication Reconciliation: complete    Mishel Escalante LPN

## 2019-03-20 NOTE — PROGRESS NOTES
Patient alert, oriented and makes needs known.  Has had complaints of headache and had mild relief from tylenol, cool cloth, and dim lights.  Has had nausea intermittently, no vomiting- received scheduled zofran and prn compazine, cool cloth and small sips of clear liquids with some relief.  Temperature was 99.9 and is now down to 99.1.  Lungs are coarse, has dry/tight cough, denies shortness of breath at rest and with exertion.  Evelin Kirkpatrick RN on 3/20/2019 at 5:59 PM

## 2019-03-21 PROBLEM — J18.9 CAP (COMMUNITY ACQUIRED PNEUMONIA): Status: ACTIVE | Noted: 2019-03-21

## 2019-03-21 LAB — PROCALCITONIN SERPL-MCNC: 0.3 NG/ML

## 2019-03-21 PROCEDURE — 94640 AIRWAY INHALATION TREATMENT: CPT

## 2019-03-21 PROCEDURE — 25800030 ZZH RX IP 258 OP 636: Performed by: INTERNAL MEDICINE

## 2019-03-21 PROCEDURE — 99233 SBSQ HOSP IP/OBS HIGH 50: CPT | Performed by: INTERNAL MEDICINE

## 2019-03-21 PROCEDURE — 25000128 H RX IP 250 OP 636: Performed by: INTERNAL MEDICINE

## 2019-03-21 PROCEDURE — 36415 COLL VENOUS BLD VENIPUNCTURE: CPT | Performed by: INTERNAL MEDICINE

## 2019-03-21 PROCEDURE — 40000275 ZZH STATISTIC RCP TIME EA 10 MIN

## 2019-03-21 PROCEDURE — 84145 PROCALCITONIN (PCT): CPT | Performed by: INTERNAL MEDICINE

## 2019-03-21 PROCEDURE — 25000132 ZZH RX MED GY IP 250 OP 250 PS 637: Performed by: INTERNAL MEDICINE

## 2019-03-21 PROCEDURE — 25000125 ZZHC RX 250: Performed by: INTERNAL MEDICINE

## 2019-03-21 PROCEDURE — 94640 AIRWAY INHALATION TREATMENT: CPT | Mod: 76

## 2019-03-21 PROCEDURE — 87040 BLOOD CULTURE FOR BACTERIA: CPT | Performed by: INTERNAL MEDICINE

## 2019-03-21 PROCEDURE — 12000000 ZZH R&B MED SURG/OB

## 2019-03-21 PROCEDURE — G0378 HOSPITAL OBSERVATION PER HR: HCPCS

## 2019-03-21 RX ORDER — LORAZEPAM 2 MG/ML
0.5 INJECTION INTRAMUSCULAR EVERY 4 HOURS PRN
Status: DISCONTINUED | OUTPATIENT
Start: 2019-03-21 | End: 2019-03-22 | Stop reason: HOSPADM

## 2019-03-21 RX ORDER — LEVOFLOXACIN 5 MG/ML
750 INJECTION, SOLUTION INTRAVENOUS EVERY 24 HOURS
Status: DISCONTINUED | OUTPATIENT
Start: 2019-03-21 | End: 2019-03-22 | Stop reason: HOSPADM

## 2019-03-21 RX ORDER — ALUMINA, MAGNESIA, AND SIMETHICONE 2400; 2400; 240 MG/30ML; MG/30ML; MG/30ML
20 SUSPENSION ORAL EVERY 4 HOURS
Status: DISCONTINUED | OUTPATIENT
Start: 2019-03-21 | End: 2019-03-22 | Stop reason: HOSPADM

## 2019-03-21 RX ADMIN — SODIUM CHLORIDE: 9 INJECTION, SOLUTION INTRAVENOUS at 06:00

## 2019-03-21 RX ADMIN — LORAZEPAM 0.5 MG: 2 INJECTION INTRAMUSCULAR; INTRAVENOUS at 11:13

## 2019-03-21 RX ADMIN — SODIUM CHLORIDE: 9 INJECTION, SOLUTION INTRAVENOUS at 19:48

## 2019-03-21 RX ADMIN — ALBUTEROL SULFATE 2.5 MG: 2.5 SOLUTION RESPIRATORY (INHALATION) at 14:50

## 2019-03-21 RX ADMIN — ONDANSETRON 4 MG: 2 INJECTION INTRAMUSCULAR; INTRAVENOUS at 21:22

## 2019-03-21 RX ADMIN — ONDANSETRON 4 MG: 2 INJECTION INTRAMUSCULAR; INTRAVENOUS at 14:53

## 2019-03-21 RX ADMIN — ALBUTEROL SULFATE 2.5 MG: 2.5 SOLUTION RESPIRATORY (INHALATION) at 18:23

## 2019-03-21 RX ADMIN — SALINE NASAL SPRAY 1 SPRAY: 1.5 SOLUTION NASAL at 20:02

## 2019-03-21 RX ADMIN — OSELTAMIVIR 75 MG: 75 CAPSULE ORAL at 09:28

## 2019-03-21 RX ADMIN — ALBUTEROL SULFATE 2.5 MG: 2.5 SOLUTION RESPIRATORY (INHALATION) at 10:39

## 2019-03-21 RX ADMIN — ACETAMINOPHEN 650 MG: 325 TABLET, FILM COATED ORAL at 21:23

## 2019-03-21 RX ADMIN — ALBUTEROL SULFATE 2.5 MG: 2.5 SOLUTION RESPIRATORY (INHALATION) at 07:32

## 2019-03-21 RX ADMIN — ASPIRIN 81 MG 81 MG: 81 TABLET ORAL at 09:28

## 2019-03-21 RX ADMIN — ACETAMINOPHEN 650 MG: 325 TABLET, FILM COATED ORAL at 14:56

## 2019-03-21 RX ADMIN — ACETAMINOPHEN 650 MG: 325 TABLET, FILM COATED ORAL at 06:00

## 2019-03-21 RX ADMIN — ONDANSETRON 4 MG: 2 INJECTION INTRAMUSCULAR; INTRAVENOUS at 04:48

## 2019-03-21 RX ADMIN — ALUMINUM HYDROXIDE, MAGNESIUM HYDROXIDE, AND DIMETHICONE 20 ML: 400; 400; 40 SUSPENSION ORAL at 14:55

## 2019-03-21 RX ADMIN — LEVOFLOXACIN 750 MG: 5 INJECTION, SOLUTION INTRAVENOUS at 06:52

## 2019-03-21 RX ADMIN — LORAZEPAM 0.5 MG: 2 INJECTION INTRAMUSCULAR; INTRAVENOUS at 21:22

## 2019-03-21 RX ADMIN — SALINE NASAL SPRAY 1 SPRAY: 1.5 SOLUTION NASAL at 06:54

## 2019-03-21 RX ADMIN — ALUMINUM HYDROXIDE, MAGNESIUM HYDROXIDE, AND DIMETHICONE 20 ML: 400; 400; 40 SUSPENSION ORAL at 11:04

## 2019-03-21 RX ADMIN — OSELTAMIVIR 75 MG: 75 CAPSULE ORAL at 21:22

## 2019-03-21 ASSESSMENT — ACTIVITIES OF DAILY LIVING (ADL)
FALL_HISTORY_WITHIN_LAST_SIX_MONTHS: NO
ADLS_ACUITY_SCORE: 12
COGNITION: 0 - NO COGNITION ISSUES REPORTED
AMBULATION: 0-->INDEPENDENT
TOILETING: 0-->INDEPENDENT
ADLS_ACUITY_SCORE: 12
ADLS_ACUITY_SCORE: 12
TRANSFERRING: 0-->INDEPENDENT
DRESS: 0-->INDEPENDENT
SWALLOWING: 0-->SWALLOWS FOODS/LIQUIDS WITHOUT DIFFICULTY
BATHING: 0-->INDEPENDENT
RETIRED_EATING: 0-->INDEPENDENT
RETIRED_COMMUNICATION: 0-->UNDERSTANDS/COMMUNICATES WITHOUT DIFFICULTY

## 2019-03-21 NOTE — PLAN OF CARE
"Nausea and Vomiting  Fluid and Electrolyte Balance  3/21/2019 1522 by Vilma Johnson, RN  Note  Pt was able to sleep after Ativan given. Pt given scheduled Tylenol, Mylanta, and Zofran. Pt immediately vomited 200mL, tylenol tablets were not seen in emesis, pt declined additional pain medication. Pt states she is \"miserable\" with generalized body aches. VSS, pt afebrile. Call light within reach, IV infusing, pt voiding well. Will continue to monitor.  Vilma Johnson, RN on 3/21/2019 at 3:26 PM       "

## 2019-03-21 NOTE — PROGRESS NOTES
Patient spiking temps t/o evening, with highest reaching 101.6, otherwise VSS. C/o chest pain and abd pain from frequent non-prod cough. Had 1 small emesis last evening, and another this AM. Independent in room. Voided x 3 t/o night. LS dim t/o all lung fields. Having body aches and chills. Last temp 100.7. Will continue to monitor and intervene as needed. Jinny Gonzales RN on 3/21/2019 at 7:12 AM

## 2019-03-21 NOTE — PROGRESS NOTES
Dr. Stacy notified that patient has been spiking temps t/o the night, with highest temp reaching 101.6. Telephone order for blood cultures and Levaquin 750 mg IV daily. Jinny Gonzales RN on 3/21/2019 at 6:30 AM

## 2019-03-21 NOTE — PLAN OF CARE
Infection  Infection Symptom Resolution  3/21/2019 1307 by Vilma Johnson, RN  Note  Pt A&O x4, ambulating in room independently. Temp has been 99.0-99.9 throughout shift. Pt reports generalized body aches and left sided face/sinus pain. Pt declined PRN ibuprofen and nasal spray. Pt reporting nausea, declined PRN medications, pt vomited small amount this AM, nursing assist reported to writer that it was mainly phlegm. Pt had 3 bites of cream of wheat, declined lunch, able to swallow oral tablets. Pt given PRN Ativan at 1113 for sleep. Pt's  here to visit, update given to pt's daughter. Other VSS. Will continue to monitor.   Vilma Johnson, RN on 3/21/2019 at 1:20 PM

## 2019-03-21 NOTE — PROGRESS NOTES
[facility]    Hospitalist Progress Note      Assessment & Plan   Fay Macedo is a 68 year old female who was admitted on 3/20/2019.     Influenza A    Assessment: Mild symptomatic improvement.  PCR positive on admit.    Plan: - Tamiflu day 2 significant symptoms and hospitalization              - scheduled nebulizer's and apap              - droplet precautions              - follow-up Chest x-ray findings as an outpatient to ensure clearing               -Encouraged to get a flu shot next year she has not done this yet this year    Community Acquired Pneumonia  Assessment: Persistent fevers overnight, ongoing green productive cough and given her chest x-ray findings albeit decreasing pro-calcitonin benefit of outweighs risk and patient agreeable.  Personally discussed patient's complete course of care rationale for treatment with both  and wife and very appreciative of care.  Plan: - IV Levaquin day 1   - follow-up blood cultures   - aggressive pulmonary toilet as above    Active Problems:    Intractable nausea and vomiting    Assessment: Improved today stable likely related to viral infection, no evidence of bowel obstruction or metabolic abnormalities on admit    Plan: - Schedule Zofran     - Scheduled Maalox   - as needed Ativan                FEN: regular diet, normal saline at 75mL/hr  PPX: scd's    Code Status: Full Code    Ben Stacy    Interval History   Overnight difficulty with sleep, ongoing mild headache, intermittent high fevers, ongoing green productive cough but no shortness of breath or wheezing, able to keep down small amount of breakfast without nausea or vomiting this morning, epigastric pain especially with burping, continues to pass gas no other abdominal pain, no dysuria hematuria urgency or frequency, no other skin sores or rashes, no sore throats or lymphadenopathy, no other new complaints.    -Data reviewed today: I reviewed all new labs and imaging  results over the last 24 hours. I personally reviewed no images or EKG's today.    Physical Exam   Temp: 99.9  F (37.7  C) Temp src: Tympanic BP: 120/68 Pulse: 103   Resp: 22 SpO2: 96 % O2 Device: None (Room air)    Vitals:    03/20/19 1244 03/21/19 0309   Weight: 84.5 kg (186 lb 4.6 oz) 84.1 kg (185 lb 6.4 oz)     Vital Signs with Ranges  Temp:  [99  F (37.2  C)-101.6  F (38.7  C)] 99.9  F (37.7  C)  Pulse:  [] 103  Resp:  [18-22] 22  BP: (120-150)/(68-78) 120/68  SpO2:  [93 %-96 %] 96 %  I/O last 3 completed shifts:  In: 3519 [P.O.:910; I.V.:2609]  Out: 1000 [Urine:975; Emesis/NG output:25]    Exam:  GENERAL: Talkative, sitting up in bed, in no apparent distress.  CARDIOVASCULAR: regular rate and rhythm, no murmurs, rubs, or gallops. Normal S1/S2. No lower extremity edema.   RESPIRATORY:  Clear to auscultation bilaterally in all fields, no focal rhonchi or crackles, no accessory muscle use or evidence of respiratory distress.    GI: soft, mild epigastric tenderness, no hepatosplenomegaly, non-distended, normoactive bowel sounds.  MUSCULOSKELETAL: warm and well perfused, 2+ dorsalis pedis pulses.    SKIN: no pallor, jaundice or rashes.  NEUROLOGY: AAOx3, follows commands, speech and language without focal deficits.           Medications     sodium chloride 75 mL/hr at 03/21/19 0926       acetaminophen  650 mg Oral TID     albuterol  2.5 mg Nebulization Q4H While awake     alum & mag hydroxide-simethicone  20 mL Oral Q4H     aspirin  81 mg Oral Daily     levofloxacin  750 mg Intravenous Q24H     ondansetron  4 mg Intravenous TID     oseltamivir  75 mg Oral BID     sodium chloride (PF)  3 mL Intracatheter Q8H       Data   Recent Labs   Lab 03/20/19  1116   WBC 8.5   HGB 14.2   MCV 90         POTASSIUM 3.8   CHLORIDE 99   CO2 26   BUN 9   CR 0.68   ANIONGAP 9   EBENEZER 9.4   *       No results found for this or any previous visit (from the past 24 hour(s)).

## 2019-03-22 VITALS
TEMPERATURE: 98.6 F | HEART RATE: 86 BPM | OXYGEN SATURATION: 95 % | DIASTOLIC BLOOD PRESSURE: 82 MMHG | WEIGHT: 185.4 LBS | RESPIRATION RATE: 18 BRPM | SYSTOLIC BLOOD PRESSURE: 150 MMHG | BODY MASS INDEX: 29.04 KG/M2

## 2019-03-22 LAB
ANION GAP SERPL CALCULATED.3IONS-SCNC: 7 MMOL/L (ref 3–14)
BUN SERPL-MCNC: 4 MG/DL (ref 7–25)
CALCIUM SERPL-MCNC: 8.8 MG/DL (ref 8.6–10.3)
CHLORIDE SERPL-SCNC: 106 MMOL/L (ref 98–107)
CO2 SERPL-SCNC: 25 MMOL/L (ref 21–31)
CREAT SERPL-MCNC: 0.59 MG/DL (ref 0.6–1.2)
ERYTHROCYTE [DISTWIDTH] IN BLOOD BY AUTOMATED COUNT: 13.2 % (ref 10–15)
GFR SERPL CREATININE-BSD FRML MDRD: >90 ML/MIN/{1.73_M2}
GLUCOSE SERPL-MCNC: 110 MG/DL (ref 70–105)
HCT VFR BLD AUTO: 38.1 % (ref 35–47)
HGB BLD-MCNC: 12.1 G/DL (ref 11.7–15.7)
MAGNESIUM SERPL-MCNC: 2.1 MG/DL (ref 1.9–2.7)
MCH RBC QN AUTO: 29.7 PG (ref 26.5–33)
MCHC RBC AUTO-ENTMCNC: 31.8 G/DL (ref 31.5–36.5)
MCV RBC AUTO: 93 FL (ref 78–100)
PLATELET # BLD AUTO: 271 10E9/L (ref 150–450)
POTASSIUM SERPL-SCNC: 3.7 MMOL/L (ref 3.5–5.1)
RBC # BLD AUTO: 4.08 10E12/L (ref 3.8–5.2)
SODIUM SERPL-SCNC: 138 MMOL/L (ref 134–144)
WBC # BLD AUTO: 7.7 10E9/L (ref 4–11)

## 2019-03-22 PROCEDURE — 25000132 ZZH RX MED GY IP 250 OP 250 PS 637: Performed by: INTERNAL MEDICINE

## 2019-03-22 PROCEDURE — 83735 ASSAY OF MAGNESIUM: CPT | Performed by: INTERNAL MEDICINE

## 2019-03-22 PROCEDURE — 36415 COLL VENOUS BLD VENIPUNCTURE: CPT | Performed by: INTERNAL MEDICINE

## 2019-03-22 PROCEDURE — 94640 AIRWAY INHALATION TREATMENT: CPT

## 2019-03-22 PROCEDURE — 85027 COMPLETE CBC AUTOMATED: CPT | Performed by: INTERNAL MEDICINE

## 2019-03-22 PROCEDURE — 94640 AIRWAY INHALATION TREATMENT: CPT | Mod: 76

## 2019-03-22 PROCEDURE — 40000275 ZZH STATISTIC RCP TIME EA 10 MIN

## 2019-03-22 PROCEDURE — 25000125 ZZHC RX 250: Performed by: INTERNAL MEDICINE

## 2019-03-22 PROCEDURE — 99238 HOSP IP/OBS DSCHRG MGMT 30/<: CPT | Performed by: INTERNAL MEDICINE

## 2019-03-22 PROCEDURE — 80048 BASIC METABOLIC PNL TOTAL CA: CPT | Performed by: INTERNAL MEDICINE

## 2019-03-22 PROCEDURE — 25000128 H RX IP 250 OP 636: Performed by: INTERNAL MEDICINE

## 2019-03-22 RX ORDER — LORAZEPAM 0.5 MG/1
0.5 TABLET ORAL EVERY 6 HOURS PRN
Qty: 6 TABLET | Refills: 0 | Status: SHIPPED | OUTPATIENT
Start: 2019-03-22

## 2019-03-22 RX ORDER — OSELTAMIVIR PHOSPHATE 75 MG/1
75 CAPSULE ORAL 2 TIMES DAILY
Qty: 6 CAPSULE | Refills: 0 | Status: SHIPPED | OUTPATIENT
Start: 2019-03-22 | End: 2019-03-25

## 2019-03-22 RX ORDER — LEVOFLOXACIN 500 MG/1
500 TABLET, FILM COATED ORAL DAILY
Qty: 5 TABLET | Refills: 0 | Status: SHIPPED | OUTPATIENT
Start: 2019-03-22 | End: 2019-03-27

## 2019-03-22 RX ORDER — ONDANSETRON 4 MG/1
4 TABLET, FILM COATED ORAL EVERY 6 HOURS PRN
Qty: 12 TABLET | Refills: 0 | Status: SHIPPED | OUTPATIENT
Start: 2019-03-22

## 2019-03-22 RX ADMIN — ONDANSETRON 4 MG: 2 INJECTION INTRAMUSCULAR; INTRAVENOUS at 06:03

## 2019-03-22 RX ADMIN — ALBUTEROL SULFATE 2.5 MG: 2.5 SOLUTION RESPIRATORY (INHALATION) at 07:45

## 2019-03-22 RX ADMIN — LEVOFLOXACIN 750 MG: 5 INJECTION, SOLUTION INTRAVENOUS at 09:18

## 2019-03-22 RX ADMIN — ASPIRIN 81 MG 81 MG: 81 TABLET ORAL at 09:19

## 2019-03-22 RX ADMIN — ACETAMINOPHEN 650 MG: 325 TABLET, FILM COATED ORAL at 06:04

## 2019-03-22 RX ADMIN — OSELTAMIVIR 75 MG: 75 CAPSULE ORAL at 09:19

## 2019-03-22 ASSESSMENT — ACTIVITIES OF DAILY LIVING (ADL)
ADLS_ACUITY_SCORE: 12

## 2019-03-22 NOTE — PROGRESS NOTES
[facility] DISCHARGE NOTE    Patient discharged to home at 12:40 PM via wheel chair. Accompanied by spouse and staff. Discharge instructions reviewed with patient and spouse, opportunity offered to ask questions. Prescriptions sent to patients preferred pharmacy, paper prescription for Ativan sent with pt . All belongings sent with patient.    Vilma Johnson

## 2019-03-22 NOTE — PHARMACY - DISCHARGE MEDICATION RECONCILIATION AND EDUCATION
Pharmacy:  Discharge Counseling and Medication Reconciliation    Fay Macedo  [address] [address]  [phone] (home)   68 year old female  PCP: No Ref-Primary, Physician    Allergies: Patient has no known allergies.    Discharge Counseling:    Pharmacist met with patient (and/or family) today to review the medication portion of the After Visit Summary (with an emphasis on NEW medications) and to address patient's questions/concerns.    Summary of Education: Provided education on Tamiflu, Levaquin, Ativan, and Zofran     Materials Provided:  MedCounselor sheets printed from Clinical Pharmacology on: Tamiflu, Levaquin, Ativan, and Zofran     Discharge Medication Reconciliation:    Shankar Minor RP has reviewed the patient's discharge medication orders and has compared them to the inpatient medication administration record and to what the patient was taking prior to admission - any discrepancies have been resolved.    It has been determined that the patient has an adequate supply of medications available or which can be obtained from the patient's preferred pharmacy, which HE/SHE has confirmed as: [facility] [An updated medication list will be faxed to the patient's pharmacy.]    Thank you for the consult.    Shankar Minor RPH........March 22, 2019 9:10 AM

## 2019-03-22 NOTE — DISCHARGE SUMMARY
[facility]    Discharge Summary  Hospitalist    Date of Admission:  3/20/2019  Date of Discharge:  3/22/2019  Discharging Provider: Ben Stacy  Date of Service (when I saw the patient): 03/22/19    Discharge Diagnoses   Principal Problem:    Influenza A (3/20/2019)  Active Problems:    Intractable nausea and vomiting (3/20/2019)    CAP (community acquired pneumonia) (3/21/2019)      History of Present Illness   Fay Macedo is a 68 year old female non-smoker who presents with influenza A and community-acquired pneumonia.  Patient was seen in [facility] on 3/18, thought to have a URI and was prescribed some cough suppressants.  These made her sick to her stomach and over the last number of days or flulike symptoms have remained persistent with a productive cough, sore throat and frequent nonbilious nonbloody vomiting.  She had been having difficulty can keep any fluids down, presented back to clinic, there she underwent a chest x-ray with a possible left lower lobe infiltrate and she was subsequently admitted for further management      Hospital Course   Fay Macedo was admitted on 3/20/2019.  The following problems were addressed during her hospitalization:    Influenza A: PCR positive on admit.  Started on Tamiflu as well as Tylenol and ibuprofen.  She symptomatically improved and will complete a full 5-day course of Tamiflu.  Her nausea and vomiting also significantly improved, she is able to tolerate a regular diet prior to discharge and was provided a prescription for Zofran as needed as well as 6 tabs of Ativan to be used nightly as needed.     Community Acquired Pneumonia: Night of hospital day 1 she had persistent fevers, ongoing green productive cough no supplemental oxygen requirement during her stay, and given her chest x-ray findings albeit decreasing pro-calcitonin it was felt the benefit of empiric antibiotics outweighs risk and patient agreeable.    Started on IV  Levaquin and remained afebrile 24 hours prior to discharge white blood cell count remained normal, again she symptomatically improved and cough became less productive.  She will complete a full 7-day course of abx.     Ben Stacy MD    Significant Results and Procedures   none    Pending Results   These results will be followed up by Salma Mejia NP  Unresulted Labs Ordered in the Past 30 Days of this Admission     Date and Time Order Name Status Description    3/21/2019 0620 Blood culture Preliminary     3/21/2019 0620 Blood culture Preliminary           Code Status   Full Code       Primary Care Physician   Physician No Ref-Primary    Physical Exam   Temp: 98.8  F (37.1  C) Temp src: Tympanic BP: 124/66 Pulse: 95   Resp: 20 SpO2: 94 % O2 Device: None (Room air)    Vitals:    03/20/19 1244 03/21/19 0309   Weight: 84.5 kg (186 lb 4.6 oz) 84.1 kg (185 lb 6.4 oz)     Vital Signs with Ranges  Temp:  [98.8  F (37.1  C)-99.9  F (37.7  C)] 98.8  F (37.1  C)  Pulse:  [] 95  Resp:  [16-22] 20  BP: (120-150)/(62-78) 124/66  SpO2:  [92 %-97 %] 94 %  I/O last 3 completed shifts:  In: 2199 [P.O.:200; I.V.:1999]  Out: 2400 [Urine:2200; Emesis/NG output:200]    Exam:   GENERAL: Talkative, in no apparent distress.  CARDIOVASCULAR: regular rate and rhythm, no murmurs, rubs, or gallops. Normal S1/S2. No lower extremity edema.   RESPIRATORY: clear to auscultation bilaterally, no wheezes, no crackles.   GI: soft, non-tender, non-distended, normoactive bowel sounds.  MUSCULOSKELETAL: warm and well perfused, 2+ dorsalis pedis pulses bilaterally.    SKIN: no pallor,jaundice, or rashes    Discharge Disposition   Discharged to home  Condition at discharge: Stable    Consultations This Hospital Stay   None    Time Spent on this Encounter   IBen, personally saw the patient today and spent less than or equal to 30 minutes discharging this patient.    Discharge Orders      Reason for your hospital stay    Influenza A and  pneumonia     Follow-up and recommended labs and tests     Salma Leedoris on Thursday, April 4 at 1:15pm     Activity    Your activity upon discharge: activity as tolerated     When to contact your care team    Call your primary doctor if you have any of the following: temperature greater than 101,  increased shortness of breath, increased drainage, increased swelling or increased pain.     Discharge Instructions    -Take the Levaquin once daily starting tomorrow until they are gone to clear up the pneumonia, take the Tamiflu until they are gone to clear up the influenza, use Zofran as needed for nausea or vomiting, use the Ativan as needed for sleep     Diet    Follow this diet upon discharge: Orders Placed This Encounter      Regular Diet Adult     Discharge Medications   Current Discharge Medication List      START taking these medications    Details   levofloxacin (LEVAQUIN) 500 MG tablet Take 1 tablet (500 mg) by mouth daily for 5 days  Qty: 5 tablet, Refills: 0    Associated Diagnoses: Community acquired pneumonia of left lower lobe of lung (H)      LORazepam (ATIVAN) 0.5 MG tablet Take 1 tablet (0.5 mg) by mouth every 6 hours as needed for nausea or sleep  Qty: 6 tablet, Refills: 0    Associated Diagnoses: Intractable vomiting with nausea, unspecified vomiting type      ondansetron (ZOFRAN) 4 MG tablet Take 1 tablet (4 mg) by mouth every 6 hours as needed for nausea or vomiting  Qty: 12 tablet, Refills: 0    Associated Diagnoses: Intractable vomiting with nausea, unspecified vomiting type      oseltamivir (TAMIFLU) 75 MG capsule Take 1 capsule (75 mg) by mouth 2 times daily for 3 days  Qty: 6 capsule, Refills: 0    Associated Diagnoses: Influenza A         CONTINUE these medications which have NOT CHANGED    Details   aspirin (ASA) 81 MG tablet Take 81 mg by mouth daily       benzonatate (TESSALON) 200 MG capsule Take 1 capsule (200 mg) by mouth 3 times daily as needed for cough  Qty: 30 capsule, Refills: 0     Associated Diagnoses: Influenza-like illness      bromfenac (PROLENSA) 0.07 % ophthalmic solution Place 1 drop into both eyes daily       calcium carbonate-vitamin D (OS-EBENEZER) 500-400 MG-UNIT tablet Take 2 tablets by mouth 2 times daily      guaiFENesin-codeine (ROBITUSSIN AC) 100-10 MG/5ML solution Take 10 mLs by mouth every 6 hours as needed for cough  Qty: 160 mL, Refills: 0    Associated Diagnoses: Influenza-like illness      multivitamin, therapeutic (THERA-VIT) TABS tablet Take 1 tablet by mouth daily      omega 3 1000 MG CAPS Take 1 capsule by mouth 2 times daily       PREMARIN 0.45 MG tablet Take 0.45 mg by mouth daily       Psyllium 48.57 % POWD Take 1 Scoop by mouth daily       sodium chloride (OCEAN) 0.65 % nasal spray Spray 1 spray into both nostrils daily as needed for congestion      vitamin B-12 (CYANOCOBALAMIN) 250 MCG tablet Take 250 mcg by mouth daily       triamcinolone (KENALOG) 0.1 % external ointment Apply topically 2 times daily as needed for irritation           Allergies   No Known Allergies  Data   Most Recent 3 CBC's:  Recent Labs   Lab Test 03/22/19  0420 03/20/19  1116   WBC 7.7 8.5   HGB 12.1 14.2   MCV 93 90    212      Most Recent 3 BMP's:  Recent Labs   Lab Test 03/22/19  0420 03/20/19  1116    134   POTASSIUM 3.7 3.8   CHLORIDE 106 99   CO2 25 26   BUN 4* 9   CR 0.59* 0.68   ANIONGAP 7 9   EBENEZER 8.8 9.4   * 136*     Most Recent 2 LFT's:No lab results found.  Most Recent INR's and Anticoagulation Dosing History:  Anticoagulation Dose History     There is no flowsheet data to display.        Most Recent 3 Troponin's:No lab results found.  Most Recent Cholesterol Panel:No lab results found.  Most Recent 6 Bacteria Isolates From Any Culture (See EPIC Reports for Culture Details):  Recent Labs   Lab Test 03/21/19  0652 03/21/19  0640 04/14/18  1155   CULT No growth after 1 day No growth after 1 day 10,000 to 50,000 colonies/mL  Escherichia coli  *     Most Recent TSH,  T4 and A1c Labs:No lab results found.  Results for orders placed or performed during the hospital encounter of 03/20/19   XR Chest 2 Views    Narrative    PROCEDURE:  XR CHEST 2 VW    HISTORY:  Cough.     COMPARISON:  None.    FINDINGS:   The cardiac silhouette is normal in size. The pulmonary vasculature is  normal.  There are mild airspace opacities in the left lung base. The  lungs are otherwise clear. No pleural effusion or pneumothorax. Old  left clavicular fracture is noted.      Impression    IMPRESSION:  Left basilar infiltrate. Suggest follow-up to ensure  resolution.      JOSR BENSON MD

## 2019-03-22 NOTE — PLAN OF CARE
Nausea and Vomiting  Fluid and Electrolyte Balance  3/22/2019 0926 by Vilma Johnson, RN  Note  Pt has infrequent nonproductive cough. LS clear, HR regular, BS active. Pt tolerated breakfast well. Pt denies nausea. VSS, pt afebrile. Will continue to monitor.   Vilma Johnson, RN on 3/22/2019 at 9:28 AM

## 2019-03-22 NOTE — PLAN OF CARE
Patient here for influenza A. Lung sounds clear, denies any SOB, notable frequent, non-productive cough. Noted to be nauseous tonight, given scheduled Zofran. Post intervention, patient denies nausea. Patient A&O. Heart rate regular. Bowel sounds active. Patient requesting ativan, administered at 2122, see MAR. Patient resting through the night. Vitals stable, /62 (BP Location: Left arm)   Pulse 97   Temp 99.7  F (37.6  C) (Tympanic)   Resp 16   Wt 84.1 kg (185 lb 6.4 oz)   SpO2 97%   BMI 29.04 kg/m      Mark Umana, RN 03/22/19 1:54 AM

## 2019-03-27 LAB
BACTERIA SPEC CULT: NORMAL
BACTERIA SPEC CULT: NORMAL
SPECIMEN SOURCE: NORMAL
SPECIMEN SOURCE: NORMAL

## 2021-01-03 ENCOUNTER — HEALTH MAINTENANCE LETTER (OUTPATIENT)
Age: 71
End: 2021-01-03

## 2021-10-10 ENCOUNTER — HEALTH MAINTENANCE LETTER (OUTPATIENT)
Age: 71
End: 2021-10-10

## 2022-01-29 ENCOUNTER — HEALTH MAINTENANCE LETTER (OUTPATIENT)
Age: 72
End: 2022-01-29

## 2022-09-18 ENCOUNTER — HEALTH MAINTENANCE LETTER (OUTPATIENT)
Age: 72
End: 2022-09-18

## 2023-01-28 ENCOUNTER — HEALTH MAINTENANCE LETTER (OUTPATIENT)
Age: 73
End: 2023-01-28

## 2023-05-06 ENCOUNTER — HEALTH MAINTENANCE LETTER (OUTPATIENT)
Age: 73
End: 2023-05-06

## 2024-08-16 ENCOUNTER — OFFICE VISIT (OUTPATIENT)
Dept: FAMILY MEDICINE | Facility: OTHER | Age: 74
End: 2024-08-16
Attending: STUDENT IN AN ORGANIZED HEALTH CARE EDUCATION/TRAINING PROGRAM
Payer: COMMERCIAL

## 2024-08-16 ENCOUNTER — HOSPITAL ENCOUNTER (OUTPATIENT)
Dept: GENERAL RADIOLOGY | Facility: OTHER | Age: 74
Discharge: HOME | End: 2024-08-16
Payer: COMMERCIAL

## 2024-08-16 VITALS
WEIGHT: 182.6 LBS | HEART RATE: 96 BPM | TEMPERATURE: 99.2 F | BODY MASS INDEX: 28.66 KG/M2 | HEIGHT: 67 IN | SYSTOLIC BLOOD PRESSURE: 150 MMHG | RESPIRATION RATE: 16 BRPM | OXYGEN SATURATION: 96 % | DIASTOLIC BLOOD PRESSURE: 78 MMHG

## 2024-08-16 DIAGNOSIS — H57.89 REDNESS OF LEFT EYE: ICD-10-CM

## 2024-08-16 DIAGNOSIS — H10.32 ACUTE BACTERIAL CONJUNCTIVITIS OF LEFT EYE: ICD-10-CM

## 2024-08-16 DIAGNOSIS — R05.8 PRODUCTIVE COUGH: ICD-10-CM

## 2024-08-16 DIAGNOSIS — J06.9 VIRAL URI WITH COUGH: Primary | ICD-10-CM

## 2024-08-16 DIAGNOSIS — R53.83 FATIGUE, UNSPECIFIED TYPE: ICD-10-CM

## 2024-08-16 DIAGNOSIS — R09.89 CHEST CONGESTION: ICD-10-CM

## 2024-08-16 LAB
ANION GAP SERPL CALCULATED.3IONS-SCNC: 10 MMOL/L (ref 7–15)
BASOPHILS # BLD AUTO: 0.1 10E3/UL (ref 0–0.2)
BASOPHILS NFR BLD AUTO: 1 %
BUN SERPL-MCNC: 8.4 MG/DL (ref 8–23)
CALCIUM SERPL-MCNC: 10.1 MG/DL (ref 8.8–10.4)
CHLORIDE SERPL-SCNC: 99 MMOL/L (ref 98–107)
CREAT SERPL-MCNC: 0.7 MG/DL (ref 0.51–0.95)
EGFRCR SERPLBLD CKD-EPI 2021: >90 ML/MIN/1.73M2
EOSINOPHIL # BLD AUTO: 0.2 10E3/UL (ref 0–0.7)
EOSINOPHIL NFR BLD AUTO: 2 %
ERYTHROCYTE [DISTWIDTH] IN BLOOD BY AUTOMATED COUNT: 13.1 % (ref 10–15)
GLUCOSE SERPL-MCNC: 137 MG/DL (ref 70–99)
HCO3 SERPL-SCNC: 26 MMOL/L (ref 22–29)
HCT VFR BLD AUTO: 42.9 % (ref 35–47)
HGB BLD-MCNC: 13.7 G/DL (ref 11.7–15.7)
IMM GRANULOCYTES # BLD: 0 10E3/UL
IMM GRANULOCYTES NFR BLD: 0 %
LYMPHOCYTES # BLD AUTO: 2.1 10E3/UL (ref 0.8–5.3)
LYMPHOCYTES NFR BLD AUTO: 22 %
MCH RBC QN AUTO: 29.1 PG (ref 26.5–33)
MCHC RBC AUTO-ENTMCNC: 31.9 G/DL (ref 31.5–36.5)
MCV RBC AUTO: 91 FL (ref 78–100)
MONOCYTES # BLD AUTO: 0.7 10E3/UL (ref 0–1.3)
MONOCYTES NFR BLD AUTO: 7 %
NEUTROPHILS # BLD AUTO: 6.4 10E3/UL (ref 1.6–8.3)
NEUTROPHILS NFR BLD AUTO: 68 %
NRBC # BLD AUTO: 0 10E3/UL
NRBC BLD AUTO-RTO: 0 /100
PLATELET # BLD AUTO: 265 10E3/UL (ref 150–450)
POTASSIUM SERPL-SCNC: 4.2 MMOL/L (ref 3.4–5.3)
RBC # BLD AUTO: 4.7 10E6/UL (ref 3.8–5.2)
SODIUM SERPL-SCNC: 135 MMOL/L (ref 135–145)
WBC # BLD AUTO: 9.5 10E3/UL (ref 4–11)

## 2024-08-16 PROCEDURE — 250N000009 HC RX 250: Mod: JW

## 2024-08-16 PROCEDURE — 80048 BASIC METABOLIC PNL TOTAL CA: CPT | Mod: ZL

## 2024-08-16 PROCEDURE — 71046 X-RAY EXAM CHEST 2 VIEWS: CPT

## 2024-08-16 PROCEDURE — 36415 COLL VENOUS BLD VENIPUNCTURE: CPT | Mod: ZL

## 2024-08-16 PROCEDURE — 99204 OFFICE O/P NEW MOD 45 MIN: CPT

## 2024-08-16 PROCEDURE — G0463 HOSPITAL OUTPT CLINIC VISIT: HCPCS | Mod: 25

## 2024-08-16 PROCEDURE — 85025 COMPLETE CBC W/AUTO DIFF WBC: CPT | Mod: ZL

## 2024-08-16 RX ORDER — DEXAMETHASONE SODIUM PHOSPHATE 4 MG/ML
10 VIAL (ML) INJECTION ONCE
Status: COMPLETED | OUTPATIENT
Start: 2024-08-16 | End: 2024-08-16

## 2024-08-16 RX ORDER — POLYMYXIN B SULFATE AND TRIMETHOPRIM 1; 10000 MG/ML; [USP'U]/ML
SOLUTION OPHTHALMIC
Qty: 10 ML | Refills: 0 | Status: SHIPPED | OUTPATIENT
Start: 2024-08-16 | End: 2024-08-23

## 2024-08-16 RX ORDER — CODEINE PHOSPHATE AND GUAIFENESIN 10; 100 MG/5ML; MG/5ML
1-2 SOLUTION ORAL EVERY 4 HOURS PRN
Qty: 118 ML | Refills: 0 | Status: SHIPPED | OUTPATIENT
Start: 2024-08-16

## 2024-08-16 RX ADMIN — DEXAMETHASONE SODIUM PHOSPHATE 10 MG: 4 INJECTION, SOLUTION INTRAMUSCULAR; INTRAVENOUS at 10:28

## 2024-08-16 ASSESSMENT — PAIN SCALES - GENERAL: PAINLEVEL: SEVERE PAIN (7)

## 2024-08-16 NOTE — PROGRESS NOTES
ASSESSMENT/PLAN:    I have reviewed the nursing notes.  I have reviewed the findings, diagnosis, plan and need for follow up with the patient.    1. Acute bacterial conjunctivitis of left eye  2. Redness of left eye    - polymixin b-trimethoprim (POLYTRIM) 43788-2.1 UNIT/ML-% ophthalmic solution; 1 drop in affected eye(s) every 3 hrs while awake for 5-7 days until resolved - max 6 doses per day  Dispense: 10 mL; Refill: 0    - Please read the attached information on pinkeye for at home care treatment.     3. Productive cough  4. Fatigue, unspecified type  5. Chest congestion    - CBC and Differential- unremarkable results    - Basic Metabolic Panel- unremarkable results other than an elevated glucose of 137.    - XR Chest 2 Views-the cardiac silhouette is normal in size.  The pulmonary vasculature is normal.  The lungs are clear.  No pleural effusion or pneumothorax.  No acute cardiopulmonary disease per radiologist.    - dexAMETHasone (DECADRON) injectable solution used ORALLY 10 mg- administered in clinic    6. Viral URI with cough    - guaiFENesin-codeine (ROBITUSSIN AC) 100-10 MG/5ML solution; Take 5-10 mLs by mouth every 4 hours as needed for cough  Dispense: 118 mL; Refill: 0    - Please read the attached information on viral upper respiratory infections for at home care treatment.     - Discussed with patient that symptoms and exam are consistent with viral illness.  Discussed that symptomatic treatment of cough is appropriate but not with antibiotics.      - Symptomatic treatment - Encouraged fluids, salt water gargles, honey (only if greater than 1 year in age due to risk of botulism), elevation, humidifier, sinus rinse/netti pot, lozenges, tea, topical vapor rub, popsicles, rest, etc     - May use over-the-counter Tylenol as needed for pain or fever    - Discussed warning signs/symptoms indicative of need to f/u    - Follow up if symptoms persist or worsen or concerns    - I explained my diagnostic  considerations and recommendations to the patient, who voiced understanding and agreement with the treatment plan. All questions were answered. We discussed potential side effects of any prescribed or recommended therapies, as well as expectations for response to treatments.    NATALY Bernardo CNP  8/16/2024  9:44 AM    HPI:    Fay Macedo is a 73 year old female who [facility] for concerns of productive cough, green sputum, chest congestion, rhinorrhea, red puffy eyes, left ear pain, headache, lightheaded, and dizziness.  Symptoms have been present for a week but states that they are worsening, was up late last night coughing nonstop.  Denies fever, shortness of breath, chest pain, rash, nausea, vomiting, diarrhea or constipation.  At home care treatment consisted of an allergy medicine that she states helped a little bit.    History reviewed. No pertinent past medical history.  Past Surgical History:   Procedure Laterality Date    TOTAL KNEE ARTHROPLASTY Right 10/2022     Social History     Tobacco Use    Smoking status: Never    Smokeless tobacco: Never   Substance Use Topics    Alcohol use: No     Current Outpatient Medications   Medication Sig Dispense Refill    guaiFENesin-codeine (ROBITUSSIN AC) 100-10 MG/5ML solution Take 5-10 mLs by mouth every 4 hours as needed for cough 118 mL 0    polymixin b-trimethoprim (POLYTRIM) 99905-2.1 UNIT/ML-% ophthalmic solution 1 drop in affected eye(s) every 3 hrs while awake for 5-7 days until resolved - max 6 doses per day 10 mL 0    aspirin (ASA) 81 MG tablet Take 81 mg by mouth daily  (Patient not taking: Reported on 8/16/2024)      bromfenac (PROLENSA) 0.07 % ophthalmic solution Place 1 drop into both eyes daily  (Patient not taking: Reported on 8/16/2024)      calcium carbonate-vitamin D (OS-EBENEZER) 500-400 MG-UNIT tablet Take 2 tablets by mouth 2 times daily (Patient not taking: Reported on 8/16/2024)      guaiFENesin-codeine (ROBITUSSIN AC)  "100-10 MG/5ML solution Take 10 mLs by mouth every 6 hours as needed for cough (Patient not taking: Reported on 8/16/2024) 160 mL 0    LORazepam (ATIVAN) 0.5 MG tablet Take 1 tablet (0.5 mg) by mouth every 6 hours as needed for nausea or sleep (Patient not taking: Reported on 8/16/2024) 6 tablet 0    multivitamin, therapeutic (THERA-VIT) TABS tablet Take 1 tablet by mouth daily (Patient not taking: Reported on 8/16/2024)      omega 3 1000 MG CAPS Take 1 capsule by mouth 2 times daily  (Patient not taking: Reported on 8/16/2024)      ondansetron (ZOFRAN) 4 MG tablet Take 1 tablet (4 mg) by mouth every 6 hours as needed for nausea or vomiting (Patient not taking: Reported on 8/16/2024) 12 tablet 0    PREMARIN 0.45 MG tablet Take 0.45 mg by mouth daily  (Patient not taking: Reported on 8/16/2024)      Psyllium 48.57 % POWD Take 1 Scoop by mouth daily  (Patient not taking: Reported on 8/16/2024)      sodium chloride (OCEAN) 0.65 % nasal spray Spray 1 spray into both nostrils daily as needed for congestion (Patient not taking: Reported on 8/16/2024)      triamcinolone (KENALOG) 0.1 % external ointment Apply topically 2 times daily as needed for irritation (Patient not taking: Reported on 8/16/2024)      vitamin B-12 (CYANOCOBALAMIN) 250 MCG tablet Take 250 mcg by mouth daily  (Patient not taking: Reported on 8/16/2024)       No Known Allergies  Past medical history, past surgical history, current medications and allergies reviewed and accurate to the best of my knowledge.      ROS:  Refer to HPI    BP (!) 150/78 (BP Location: Left arm, Patient Position: Sitting, Cuff Size: Adult Large)   Pulse 96   Temp 99.2  F (37.3  C) (Temporal)   Resp 16   Ht 1.702 m (5' 7\")   Wt 82.8 kg (182 lb 9.6 oz)   SpO2 96%   BMI 28.60 kg/m      EXAM:  General Appearance: Well appearing 73 year old female, appropriate appearance for age. No acute distress   Ears: Left TM intact, translucent with bony landmarks appreciated, no erythema, " no effusion, no bulging, no purulence.  Right TM intact, translucent with bony landmarks appreciated, no erythema, no effusion, no bulging, no purulence.  Left auditory canal clear.  Right auditory canal clear.  Normal external ears, non tender.  Eyes: conjunctivae abnormal with erythema and irritation left eye, corneas clear, green drainage and crusting, left upper eyelid swelling, pupils equal   Oropharynx: moist mucous membranes, posterior pharynx with mild erythema, tonsils absent, no post nasal drip seen, no trismus, voice clear.    Sinuses:  No sinus tenderness upon palpation of the frontal or maxillary sinuses  Nose:  Bilateral nares: no erythema, no edema, + drainage and + congestion   Neck: supple without adenopathy  Respiratory: normal chest wall and respirations.  Normal effort.  Clear to auscultation bilaterally, no wheezing, crackles or rhonchi.  No increased work of breathing.  Harsh cough appreciated.  Cardiac: RRR with no murmurs  Musculoskeletal:  Equal movement of bilateral upper extremities.  Equal movement of bilateral lower extremities.  Normal gait.    Neuro: Alert and oriented to person, place, and time.    Psychological: normal affect, alert, oriented, and pleasant.     Labs: Xray:  Results for orders placed or performed in visit on 08/16/24   XR Chest 2 Views     Status: None    Narrative    PROCEDURE:  XR CHEST 2 VIEWS    HISTORY:  Productive cough; Fatigue, unspecified type; Chest  congestion.     COMPARISON:  2019    FINDINGS:   The cardiac silhouette is normal in size. The pulmonary vasculature is  normal.  The lungs are clear. No pleural effusion or pneumothorax.      Impression    IMPRESSION:  No acute cardiopulmonary disease.      BEN CARLTON MD         SYSTEM ID:  [id number]   Basic Metabolic Panel     Status: Abnormal   Result Value Ref Range    Sodium 135 135 - 145 mmol/L    Potassium 4.2 3.4 - 5.3 mmol/L    Chloride 99 98 - 107 mmol/L    Carbon Dioxide (CO2) 26 22 - 29 mmol/L     Anion Gap 10 7 - 15 mmol/L    Urea Nitrogen 8.4 8.0 - 23.0 mg/dL    Creatinine 0.70 0.51 - 0.95 mg/dL    GFR Estimate >90 >60 mL/min/1.73m2    Calcium 10.1 8.8 - 10.4 mg/dL    Glucose 137 (H) 70 - 99 mg/dL   CBC with platelets and differential     Status: None   Result Value Ref Range    WBC Count 9.5 4.0 - 11.0 10e3/uL    RBC Count 4.70 3.80 - 5.20 10e6/uL    Hemoglobin 13.7 11.7 - 15.7 g/dL    Hematocrit 42.9 35.0 - 47.0 %    MCV 91 78 - 100 fL    MCH 29.1 26.5 - 33.0 pg    MCHC 31.9 31.5 - 36.5 g/dL    RDW 13.1 10.0 - 15.0 %    Platelet Count 265 150 - 450 10e3/uL    % Neutrophils 68 %    % Lymphocytes 22 %    % Monocytes 7 %    % Eosinophils 2 %    % Basophils 1 %    % Immature Granulocytes 0 %    NRBCs per 100 WBC 0 <1 /100    Absolute Neutrophils 6.4 1.6 - 8.3 10e3/uL    Absolute Lymphocytes 2.1 0.8 - 5.3 10e3/uL    Absolute Monocytes 0.7 0.0 - 1.3 10e3/uL    Absolute Eosinophils 0.2 0.0 - 0.7 10e3/uL    Absolute Basophils 0.1 0.0 - 0.2 10e3/uL    Absolute Immature Granulocytes 0.0 <=0.4 10e3/uL    Absolute NRBCs 0.0 10e3/uL   CBC and Differential     Status: None    Narrative    The following orders were created for panel order CBC and Differential.  Procedure                               Abnormality         Status                     ---------                               -----------         ------                     CBC with platelets and d...[[id number]]                      Final result                 Please view results for these tests on the individual orders.

## 2024-08-16 NOTE — NURSING NOTE
"Chief Complaint   Patient presents with    Cough     Started a week ago but has worsened. Was up last night non stop coughing.  Left eye is red and puffy since this morning.    Headache       Initial BP (!) 150/78 (BP Location: Left arm, Patient Position: Sitting, Cuff Size: Adult Large)   Pulse 96   Temp 99.2  F (37.3  C) (Temporal)   Resp 16   Ht 1.702 m (5' 7\")   Wt 82.8 kg (182 lb 9.6 oz)   SpO2 96%   BMI 28.60 kg/m   Estimated body mass index is 28.6 kg/m  as calculated from the following:    Height as of this encounter: 1.702 m (5' 7\").    Weight as of this encounter: 82.8 kg (182 lb 9.6 oz).  Medication Review: complete    The next two questions are to help us understand your food security.  If you are feeling you need any assistance in this area, we have resources available to support you today.          8/16/2024   SDOH- Food Insecurity   Within the past 12 months, did you worry that your food would run out before you got money to buy more? N   Within the past 12 months, did the food you bought just not last and you didn t have money to get more? N                Luiz Buitrago      "

## 2024-08-16 NOTE — PATIENT INSTRUCTIONS
I hope you feel better soon Fay!  Pinkeye: Care Instructions  Overview     Pinkeye is redness and swelling of the eye surface and the conjunctiva (the lining of the eyelid and the covering of the white part of the eye). Pinkeye is also called conjunctivitis. Pinkeye is often caused by infection with bacteria or a virus. Dry air, allergies, smoke, and chemicals are other common causes.  Pinkeye often gets better on its own in 7 to 10 days. Antibiotics only help if the pinkeye is caused by bacteria. Pinkeye caused by infection spreads easily. If an allergy or chemical is causing pinkeye, it will not go away unless you can avoid whatever is causing it.  Follow-up care is a key part of your treatment and safety. Be sure to make and go to all appointments, and call your doctor if you are having problems. It's also a good idea to know your test results and keep a list of the medicines you take.  How can you care for yourself at home?  Wash your hands often. Always wash them before and after you treat pinkeye or touch your eyes or face.  Use moist cotton or a clean, wet cloth to remove crust. Wipe from the inside corner of the eye to the outside. Use a clean part of the cloth for each wipe.  Put cold or warm wet cloths on your eye a few times a day if the eye hurts.  Do not wear contact lenses or eye makeup until the pinkeye is gone. Throw away any eye makeup you were using when you got pinkeye. Clean your contacts and storage case. If you wear disposable contacts, use a new pair when your eye has cleared and it is safe to wear contacts again.  If the doctor gave you antibiotic ointment or eyedrops, use them as directed. Use the medicine for as long as instructed, even if your eye starts looking better soon. Keep the bottle tip clean, and do not let it touch the eye area.  To put in eyedrops or ointment:  Tilt your head back, and pull your lower eyelid down with one finger.  Drop or squirt the medicine inside the lower  "lid.  Close your eye for 30 to 60 seconds to let the drops or ointment move around.  Do not touch the ointment or dropper tip to your eyelashes or any other surface.  Do not share towels, pillows, or washcloths while you have pinkeye.  When should you call for help?   Call your doctor now or seek immediate medical care if:    You have pain in your eye, not just irritation on the surface.     You have a change in vision or loss of vision.     You have an increase in discharge from the eye.     Your eye has not started to improve or begins to get worse within 48 hours after you start using antibiotics.     Pinkeye lasts longer than 7 days.   Watch closely for changes in your health, and be sure to contact your doctor if you have any problems.  Where can you learn more?  Go to https://www.[employer].net/patiented  Enter Y392 in the search box to learn more about \"Pinkeye: Care Instructions.\"  Current as of: June 5, 2023               Content Version: 14.0    4710-2381 [employer].   Care instructions adapted under license by your healthcare professional. If you have questions about a medical condition or this instruction, always ask your healthcare professional. [employer] disclaims any warranty or liability for your use of this information.      "

## 2024-10-17 ASSESSMENT — ACTIVITIES OF DAILY LIVING (ADL)
ADL_BEFORE_ADMISSION: INDEPENDENT
HISTORY OF FALLING IN THE LAST YEAR (PRIOR TO ADMISSION): NO
ADL_SCORE: 12
CHRONIC_PAIN_PRESENT: NO
NEEDS_ASSIST: NO
ADL_SHORT_OF_BREATH: NO
RECENT_DECLINE_ADL: NO

## 2024-10-22 ENCOUNTER — ANESTHESIA (OUTPATIENT)
Dept: SURGERY | Age: 74
End: 2024-10-22

## 2024-10-22 ENCOUNTER — HOSPITAL ENCOUNTER (OUTPATIENT)
Age: 74
Discharge: HOME | End: 2024-10-22
Attending: OPHTHALMOLOGY | Admitting: OPHTHALMOLOGY

## 2024-10-22 ENCOUNTER — ANESTHESIA EVENT (OUTPATIENT)
Dept: SURGERY | Age: 74
End: 2024-10-22

## 2024-10-22 VITALS
DIASTOLIC BLOOD PRESSURE: 73 MMHG | TEMPERATURE: 97.6 F | BODY MASS INDEX: 28.91 KG/M2 | WEIGHT: 179.9 LBS | HEART RATE: 72 BPM | OXYGEN SATURATION: 93 % | HEIGHT: 66 IN | RESPIRATION RATE: 18 BRPM | SYSTOLIC BLOOD PRESSURE: 159 MMHG

## 2024-10-22 DIAGNOSIS — G89.18 POSTOPERATIVE PAIN: Primary | ICD-10-CM

## 2024-10-22 PROCEDURE — 10002800 HB RX 250 W HCPCS: Performed by: OPHTHALMOLOGY

## 2024-10-22 PROCEDURE — 10001568 HB ANESTH MAC IV ADD'L 1/2 HR: Performed by: OPHTHALMOLOGY

## 2024-10-22 PROCEDURE — 10002362 HB ANESTH MAC IV 1ST 1/2 HR: Performed by: OPHTHALMOLOGY

## 2024-10-22 PROCEDURE — 10002117 HB ADDITIONAL SURGERY TIME/30 MIN: Performed by: OPHTHALMOLOGY

## 2024-10-22 PROCEDURE — 10002807 HB RX 258: Performed by: NURSE ANESTHETIST, CERTIFIED REGISTERED

## 2024-10-22 PROCEDURE — 10002807 HB RX 258: Performed by: OPHTHALMOLOGY

## 2024-10-22 PROCEDURE — 10002767 HB EXTENDED RECOVERY PER HOUR: Performed by: OPHTHALMOLOGY

## 2024-10-22 PROCEDURE — 10002801 HB RX 250 W/O HCPCS: Performed by: NURSE ANESTHETIST, CERTIFIED REGISTERED

## 2024-10-22 PROCEDURE — 10002801 HB RX 250 W/O HCPCS: Performed by: OPHTHALMOLOGY

## 2024-10-22 PROCEDURE — 67903 REPAIR EYELID DEFECT: CPT | Performed by: OPHTHALMOLOGY

## 2024-10-22 PROCEDURE — 10002803 HB RX 637: Performed by: OPHTHALMOLOGY

## 2024-10-22 PROCEDURE — 10002800 HB RX 250 W HCPCS: Performed by: NURSE ANESTHETIST, CERTIFIED REGISTERED

## 2024-10-22 PROCEDURE — 10006391 HB COMPLEX PROCEDURE: Performed by: OPHTHALMOLOGY

## 2024-10-22 PROCEDURE — 10006023 HB SUPPLY 272: Performed by: OPHTHALMOLOGY

## 2024-10-22 PROCEDURE — 15823 BLEPHARP UPR EYELID XCSV SKN: CPT | Performed by: OPHTHALMOLOGY

## 2024-10-22 PROCEDURE — 10004348 HB COUNTER-EVAL PRE-OP

## 2024-10-22 RX ORDER — DEXAMETHASONE SODIUM PHOSPHATE 4 MG/ML
INJECTION, SOLUTION INTRA-ARTICULAR; INTRALESIONAL; INTRAMUSCULAR; INTRAVENOUS; SOFT TISSUE PRN
Status: DISCONTINUED | OUTPATIENT
Start: 2024-10-22 | End: 2024-10-22

## 2024-10-22 RX ORDER — SODIUM CHLORIDE, SODIUM LACTATE, POTASSIUM CHLORIDE, CALCIUM CHLORIDE 600; 310; 30; 20 MG/100ML; MG/100ML; MG/100ML; MG/100ML
INJECTION, SOLUTION INTRAVENOUS CONTINUOUS
Status: DISCONTINUED | OUTPATIENT
Start: 2024-10-22 | End: 2024-10-22 | Stop reason: HOSPADM

## 2024-10-22 RX ORDER — BUPIVACAINE HYDROCHLORIDE AND EPINEPHRINE 5; 5 MG/ML; UG/ML
INJECTION, SOLUTION EPIDURAL; INTRACAUDAL; PERINEURAL PRN
Status: DISCONTINUED | OUTPATIENT
Start: 2024-10-22 | End: 2024-10-22 | Stop reason: HOSPADM

## 2024-10-22 RX ORDER — ERYTHROMYCIN 5 MG/G
OINTMENT OPHTHALMIC
Qty: 3.5 G | Refills: 2 | Status: SHIPPED | OUTPATIENT
Start: 2024-10-22

## 2024-10-22 RX ORDER — 0.9 % SODIUM CHLORIDE 0.9 %
10 VIAL (ML) INJECTION PRN
Status: DISCONTINUED | OUTPATIENT
Start: 2024-10-22 | End: 2024-10-22 | Stop reason: HOSPADM

## 2024-10-22 RX ORDER — SODIUM CHLORIDE 9 MG/ML
INJECTION, SOLUTION INTRAVENOUS CONTINUOUS
Status: DISCONTINUED | OUTPATIENT
Start: 2024-10-22 | End: 2024-10-22 | Stop reason: HOSPADM

## 2024-10-22 RX ORDER — 0.9 % SODIUM CHLORIDE 0.9 %
2 VIAL (ML) INJECTION EVERY 12 HOURS SCHEDULED
Status: DISCONTINUED | OUTPATIENT
Start: 2024-10-22 | End: 2024-10-22 | Stop reason: HOSPADM

## 2024-10-22 RX ORDER — DEXTROSE MONOHYDRATE 50 MG/ML
INJECTION, SOLUTION INTRAVENOUS CONTINUOUS PRN
Status: DISCONTINUED | OUTPATIENT
Start: 2024-10-22 | End: 2024-10-22 | Stop reason: HOSPADM

## 2024-10-22 RX ORDER — ACETAMINOPHEN 325 MG/1
650 TABLET ORAL EVERY 4 HOURS PRN
Status: DISCONTINUED | OUTPATIENT
Start: 2024-10-22 | End: 2024-10-22 | Stop reason: HOSPADM

## 2024-10-22 RX ORDER — METOPROLOL TARTRATE 25 MG/1
25 TABLET, FILM COATED ORAL
Status: DISCONTINUED | OUTPATIENT
Start: 2024-10-22 | End: 2024-10-22 | Stop reason: HOSPADM

## 2024-10-22 RX ORDER — ACETAMINOPHEN 650 MG/1
650 SUPPOSITORY RECTAL EVERY 4 HOURS PRN
Status: DISCONTINUED | OUTPATIENT
Start: 2024-10-22 | End: 2024-10-22 | Stop reason: HOSPADM

## 2024-10-22 RX ORDER — DEXTROSE MONOHYDRATE 25 G/50ML
25 INJECTION, SOLUTION INTRAVENOUS PRN
Status: DISCONTINUED | OUTPATIENT
Start: 2024-10-22 | End: 2024-10-22 | Stop reason: HOSPADM

## 2024-10-22 RX ORDER — PROPOFOL 10 MG/ML
INJECTION, EMULSION INTRAVENOUS PRN
Status: DISCONTINUED | OUTPATIENT
Start: 2024-10-22 | End: 2024-10-22

## 2024-10-22 RX ORDER — KETOROLAC TROMETHAMINE 5 MG/ML
1 SOLUTION OPHTHALMIC 4 TIMES DAILY PRN
Status: DISCONTINUED | OUTPATIENT
Start: 2024-10-22 | End: 2024-10-22 | Stop reason: HOSPADM

## 2024-10-22 RX ORDER — NICOTINE POLACRILEX 4 MG
30 LOZENGE BUCCAL
Status: DISCONTINUED | OUTPATIENT
Start: 2024-10-22 | End: 2024-10-22 | Stop reason: HOSPADM

## 2024-10-22 RX ORDER — ERYTHROMYCIN 5 MG/G
OINTMENT OPHTHALMIC PRN
Status: DISCONTINUED | OUTPATIENT
Start: 2024-10-22 | End: 2024-10-22 | Stop reason: HOSPADM

## 2024-10-22 RX ORDER — HYDROCODONE BITARTRATE AND ACETAMINOPHEN 7.5; 325 MG/1; MG/1
.5-1 TABLET ORAL EVERY 6 HOURS PRN
Qty: 12 TABLET | Refills: 0 | Status: SHIPPED | OUTPATIENT
Start: 2024-10-22 | End: 2024-10-25

## 2024-10-22 RX ORDER — HYDROCODONE BITARTRATE AND ACETAMINOPHEN 5; 325 MG/1; MG/1
1 TABLET ORAL EVERY 4 HOURS PRN
Status: DISCONTINUED | OUTPATIENT
Start: 2024-10-22 | End: 2024-10-22 | Stop reason: HOSPADM

## 2024-10-22 RX ORDER — ONDANSETRON 2 MG/ML
4 INJECTION INTRAMUSCULAR; INTRAVENOUS
Status: COMPLETED | OUTPATIENT
Start: 2024-10-22 | End: 2024-10-22

## 2024-10-22 RX ORDER — TETRACAINE HYDROCHLORIDE 5 MG/ML
SOLUTION OPHTHALMIC PRN
Status: DISCONTINUED | OUTPATIENT
Start: 2024-10-22 | End: 2024-10-22 | Stop reason: HOSPADM

## 2024-10-22 RX ORDER — HYDROCODONE BITARTRATE AND ACETAMINOPHEN 5; 325 MG/1; MG/1
1 TABLET ORAL EVERY 6 HOURS PRN
Qty: 8 TABLET | Refills: 0 | Status: SHIPPED | OUTPATIENT
Start: 2024-10-22 | End: 2024-10-22 | Stop reason: DRUGHIGH

## 2024-10-22 RX ADMIN — ONDANSETRON 4 MG: 2 INJECTION INTRAMUSCULAR; INTRAVENOUS at 08:56

## 2024-10-22 RX ADMIN — DEXAMETHASONE SODIUM PHOSPHATE 4 MG: 4 INJECTION INTRA-ARTICULAR; INTRALESIONAL; INTRAMUSCULAR; INTRAVENOUS; SOFT TISSUE at 09:20

## 2024-10-22 RX ADMIN — HYDROCODONE BITARTRATE AND ACETAMINOPHEN 1 TABLET: 5; 325 TABLET ORAL at 10:19

## 2024-10-22 RX ADMIN — PROPOFOL 30 MG: 10 INJECTION, EMULSION INTRAVENOUS at 09:35

## 2024-10-22 RX ADMIN — PROPOFOL INJECTABLE EMULSION 100 MCG/KG/MIN: 10 INJECTION, EMULSION INTRAVENOUS at 09:20

## 2024-10-22 RX ADMIN — WATER 2000 MG: 1 INJECTION INTRAMUSCULAR; INTRAVENOUS; SUBCUTANEOUS at 09:09

## 2024-10-22 RX ADMIN — REMIFENTANIL HYDROCHLORIDE 60 MCG: 1 INJECTION, POWDER, LYOPHILIZED, FOR SOLUTION INTRAVENOUS at 09:16

## 2024-10-22 RX ADMIN — PROPOFOL 30 MG: 10 INJECTION, EMULSION INTRAVENOUS at 09:43

## 2024-10-22 RX ADMIN — SODIUM CHLORIDE, POTASSIUM CHLORIDE, SODIUM LACTATE AND CALCIUM CHLORIDE: 600; 310; 30; 20 INJECTION, SOLUTION INTRAVENOUS at 08:56

## 2024-10-22 SDOH — SOCIAL STABILITY: SOCIAL INSECURITY: HOW OFTEN DOES ANYONE, INCLUDING FAMILY AND FRIENDS, INSULT OR TALK DOWN TO YOU?: NEVER

## 2024-10-22 SDOH — SOCIAL STABILITY: SOCIAL INSECURITY: HOW OFTEN DOES ANYONE, INCLUDING FAMILY AND FRIENDS, THREATEN YOU WITH HARM?: NEVER

## 2024-10-22 SDOH — SOCIAL STABILITY: SOCIAL INSECURITY: HOW OFTEN DOES ANYONE, INCLUDING FAMILY AND FRIENDS, PHYSICALLY HURT YOU?: NEVER

## 2024-10-22 SDOH — SOCIAL STABILITY: SOCIAL INSECURITY: HOW OFTEN DOES ANYONE, INCLUDING FAMILY AND FRIENDS, SCREAM OR CURSE AT YOU?: NEVER

## 2024-10-22 ASSESSMENT — ACTIVITIES OF DAILY LIVING (ADL)
HISTORY OF FALLING IN THE LAST YEAR (PRIOR TO ADMISSION): NO
ADL_BEFORE_ADMISSION: INDEPENDENT
RECENT_DECLINE_ADL: NO
ADL_SHORT_OF_BREATH: NO
ADL_SCORE: 12
NEEDS_ASSIST: NO
CHRONIC_PAIN_PRESENT: NO

## 2024-10-22 ASSESSMENT — PAIN SCALES - GENERAL
PAINLEVEL_OUTOF10: 2
PAINLEVEL_OUTOF10: 5
PAINLEVEL_OUTOF10: 0
PAINLEVEL_OUTOF10: 10

## 2025-02-26 ENCOUNTER — TRANSFERRED RECORDS (OUTPATIENT)
Dept: MULTI SPECIALTY CLINIC | Facility: CLINIC | Age: 75
End: 2025-02-26

## 2025-03-09 ENCOUNTER — HEALTH MAINTENANCE LETTER (OUTPATIENT)
Age: 75
End: 2025-03-09

## (undated) DEVICE — SUTURE ETHILON 6-0 P1 18IN MONO PLIABILIZE NABSB BLK 697G

## (undated) DEVICE — GLOVE SURG 6 PROTEXIS LF CRM PF BEAD CUFF STRL PLISPRN 11.5

## (undated) DEVICE — SUTURE PRMHND SABRELOC 6-0 S-14 18IN SILK 2 ARM BRAID NABSB 1780G

## (undated) DEVICE — SOLUTION IO 15ML BSS NONANTIBIOTIC IRR NA K CA MG

## (undated) DEVICE — SUTURE PRMHND 4-0 C-1 30IN SILK BRAID NABSB BLK

## (undated) DEVICE — NEEDLE DSCT 3CM MIC STRGT HEAT RST TIP UL SHRP LF DISP

## (undated) DEVICE — NEEDLE HPO 27GA .5IN REG WALL REG BVL LL HUB DEHP-FR STRL LF

## (undated) DEVICE — NEEDLE HPO 25GA 58IN REG WALL REG BVL LL HUB DEHP-FR STRL

## (undated) DEVICE — SYRINGE 3ML GRAD N-PYRG DEHP-FR PVC FREE STRL MED LF DISP LL

## (undated) DEVICE — NEEDLE HPO 18GA 1.5IN REG WALL REG BVL LL SHLD MECH DEHP-FR

## (undated) DEVICE — ELECTRODE PT RTN C30- LB 9FT CORD NONIRRITATE NONSENSITIZE

## (undated) DEVICE — SOLUTION IRR 0.9% NA CL 250 ML PLASTIC POUR BTL ISOTONIC

## (undated) DEVICE — SOLUTION SURGSCRB 4OZ PVP IOD ANTIMICROBIAL PLASTIC BTL FOAM

## (undated) DEVICE — SUTURE MICROPOINT 6-0 TG140-8 18IN PLN 2 ARM MONO ABS 1735G

## (undated) DEVICE — GLOVE SURG 8.5 PROTEXIS LF CRM PF BEAD CUFF STRL PLISPRN 12

## (undated) DEVICE — SYRINGE 10ML GRAD N-PYRG DEHP-FR PVC FREE STRL MED LF DISP

## (undated) DEVICE — SUTURE VICRYL MICROPOINT 7-0 TG140-8 18IN 2 ARM BRAID COAT J546G

## (undated) DEVICE — SYRINGE 1ML SLIP TIP STRL TB LF DISP

## (undated) DEVICE — GOWN SURG LG L3 REINFORCE SET IN SLV STRL LF DISP BLUE

## (undated) DEVICE — Device

## (undated) DEVICE — COVER SRG CNTRL STRL LF LIGHT HNDL HARMONYAIR M SERIES HRMN

## (undated) RX ORDER — LEVOFLOXACIN 5 MG/ML
INJECTION, SOLUTION INTRAVENOUS
Status: DISPENSED
Start: 2019-03-21

## (undated) RX ORDER — DEXAMETHASONE SODIUM PHOSPHATE 4 MG/ML
INJECTION, SOLUTION INTRA-ARTICULAR; INTRALESIONAL; INTRAMUSCULAR; INTRAVENOUS; SOFT TISSUE
Status: DISPENSED
Start: 2024-08-16